# Patient Record
Sex: MALE | Race: WHITE | Employment: OTHER | URBAN - METROPOLITAN AREA
[De-identification: names, ages, dates, MRNs, and addresses within clinical notes are randomized per-mention and may not be internally consistent; named-entity substitution may affect disease eponyms.]

---

## 2022-05-04 ENCOUNTER — TRANSCRIBE ORDER (OUTPATIENT)
Dept: SCHEDULING | Age: 67
End: 2022-05-04

## 2022-05-04 DIAGNOSIS — M25.512 LEFT SHOULDER PAIN, UNSPECIFIED CHRONICITY: Primary | ICD-10-CM

## 2022-05-10 ENCOUNTER — HOSPITAL ENCOUNTER (OUTPATIENT)
Dept: MRI IMAGING | Age: 67
End: 2022-05-10
Attending: ORTHOPAEDIC SURGERY

## 2022-05-12 ENCOUNTER — HOSPITAL ENCOUNTER (OUTPATIENT)
Dept: MRI IMAGING | Age: 67
Discharge: HOME | End: 2022-05-12
Attending: ORTHOPAEDIC SURGERY
Payer: COMMERCIAL

## 2022-05-12 DIAGNOSIS — M25.512 LEFT SHOULDER PAIN, UNSPECIFIED CHRONICITY: ICD-10-CM

## 2022-05-12 PROCEDURE — 73221 MRI JOINT UPR EXTREM W/O DYE: CPT

## 2022-12-20 ENCOUNTER — OFFICE VISIT (OUTPATIENT)
Dept: ORTHOPEDIC SURGERY | Age: 67
End: 2022-12-20
Payer: COMMERCIAL

## 2022-12-20 DIAGNOSIS — M25.531 RIGHT WRIST PAIN: ICD-10-CM

## 2022-12-20 DIAGNOSIS — M25.521 RIGHT ELBOW PAIN: Primary | ICD-10-CM

## 2022-12-20 DIAGNOSIS — S46.112A RUPTURE LONG HEAD BICEPS TENDON, LEFT, INITIAL ENCOUNTER: ICD-10-CM

## 2022-12-20 DIAGNOSIS — S50.11XA CONTUSION OF RIGHT FOREARM, INITIAL ENCOUNTER: ICD-10-CM

## 2022-12-20 DIAGNOSIS — M25.512 ACUTE PAIN OF LEFT SHOULDER: ICD-10-CM

## 2022-12-20 NOTE — LETTER
12/20/2022    Patient: Rosibel Flanagan   YOB: 1955   Date of Visit: 01/70/1954     Tyrone Carlson MD  [facility] [address] [address]  Via Fax: [phone]    Dear Tyrone Carlson MD,      Thank you for referring Mr. Payal Mejia to [facility] for evaluation. My notes for this consultation are attached. If you have questions, please do not hesitate to call me. I look forward to following your patient along with you.       Sincerely,    Quintin Becker MD

## 2022-12-20 NOTE — PROGRESS NOTES
Ange Officer (: 1955) is a 78 y.o. male, patient, here for evaluation of the following chief complaint(s):  Shoulder Pain (Left shoulder and right forearm)       HPI:    Patient presents to the office today with a chief complaint of right elbow wrist pain and chronic pain of the left shoulder. He originally made the appointment for his left shoulder seeking a second opinion in regards to chronic shoulder pain. Since making the appointment he slipped and fell on his back deck and landed on his forearm. He is here today with the above mentioned right elbow and wrist pain. Specifically he is noting some discomfort that is mostly located in the ulnar aspect of the wrist.  In regards to the left shoulder, he has had a surgical procedure performed. I do not have a copy of the operative report. There is an MRI within the system that was done before his surgery. Patient describes an acromioplasty and possibly distal clavicle. It also sounds as if a tenotomy of his bicep was performed. He did not know this and subsequently in his recovery developed a deformity of his bicep. He was surprised to hear that his bicep was cut during surgery as he had no prior knowledge of this. Nevertheless, he is describing some discomfort that is mostly located along the high lateral aspect of the deltoid region. He denies numbness or tingling    Allergies   Allergen Reactions    Bee Sting [Sting, Bee] Unknown (comments)    Cat/Feline Products Runny Nose     Itchy eyes, sneezing    Suphedrine [Pseudoephedrine Hcl] Other (comments)     Hyper /anxious       Current Outpatient Medications   Medication Sig    oxyCODONE IR (ROXICODONE) 10 mg Tab immediate release tablet Take 1 Tab by mouth every three (3) hours as needed. diazepam (VALIUM) 10 mg tablet Take 1 Tab by mouth every six (6) hours as needed (Muscle Spasms). ibuprofen 200 mg Cap Take 800 mg by mouth daily.       methadone (DOLOPHINE) 5 mg tablet Take 10 mg by mouth three (3) times daily. aspirin 81 mg tablet Take 81 mg by mouth daily. omeprazole (PRILOSEC) 20 mg capsule      No current facility-administered medications for this visit. Past Medical History:   Diagnosis Date    Arthritis     GERD (gastroesophageal reflux disease)     HTN (hypertension) 2010    no longer treated    Inguinal hernia 2010        Past Surgical History:   Procedure Laterality Date    HX HERNIA REPAIR  2-3-2011    [facility] REPAIR    HX TONSILLECTOMY  1961    [facility] ABDOMEN SURGERY PROC UNLISTED  2008    RT INGUNAL HERNIA    [facility] APPENDECTOMY  1964       Family History   Problem Relation Age of Onset    Cancer Father         THROAT/STOMACH    Other Mother         neuropathy/ LOW BLOOD PRESSURE    Heart Disease Mother         CHF    Diabetes Maternal Aunt     Cancer Maternal Aunt         BONE    Cancer Paternal Grandmother         BREAST        Social History     Socioeconomic History    Marital status:      Spouse name: Not on file    Number of children: Not on file    Years of education: Not on file    Highest education level: Not on file   Occupational History    Not on file   Tobacco Use    Smoking status: Former     Packs/day: 1.00     Years: 20.00     Pack years: 20.00     Types: Cigarettes     Quit date: 2009     Years since quittin.8    Smokeless tobacco: Not on file   Substance and Sexual Activity    Alcohol use: No    Drug use: No    Sexual activity: Not on file   Other Topics Concern    Not on file   Social History Narrative    Not on file     Social Determinants of Health     Financial Resource Strain: Not on file   Food Insecurity: Not on file   Transportation Needs: Not on file   Physical Activity: Not on file   Stress: Not on file   Social Connections: Not on file   Intimate Partner Violence: Not on file   Housing Stability: Not on file       Review of Systems   Musculoskeletal:         Shoulder and right elbow      Vitals:   There were no vitals taken for this visit. There is no height or weight on file to calculate BMI. Ortho Exam     Patient is alert and oriented x3. Patient is in no acute distress. Patient ambulates with a nonantalgic gait. Right elbow: No effusion of the elbow. He has full range of motion of the elbow joint. He does have a contusion and abrasion noted along the dorsal aspect of the forearm with a small amount of soft tissue and ecchymosis along the site. He has full range of motion of the elbow joint. He has no instability or crepitation. He does have tenderness noted to palpation along the ulnar aspect of his DRUJ. Negative compression test.  He has no effusion of the wrist joint. He has full range of motion. Neurovascular examination is intact. Left shoulder: He has limitation of active range of motion secondary to pain his pain is located along the anterolateral aspect of the deltoid region. He has a Yuan deformity that is noted. He has no atrophy of his deltoid. His strength is hard to ascertain because of pain but he has what appears to be 4/5 strength throughout. There is no crepitation. He has full passive range of motion. Positive impingement sign. Pain is noted along the posterior lateral aspect of the deltoid region. There is no swelling noted distally. Neurovascular examination is intact. XR Results (most recent):  Results from Appointment encounter on 12/20/22    XR WRIST RT AP/LAT    Narrative  Three-view x-ray of the right wrist reveals no acute processes noted. Cystic changes noted within the lunate which are nonpathologic and not acute. XR Results (most recent):  Results from Appointment encounter on 12/20/22    XR WRIST RT AP/LAT    Narrative  Three-view x-ray of the right wrist reveals no acute processes noted. Cystic changes noted within the lunate which are nonpathologic and not acute.       XR ELBOW RT AP/LAT    Narrative  2 view x-ray of the right elbow reveals no evidence of fracture. No evidence of osteoarthritis noted. ASSESSMENT/PLAN:    In regards to the right upper extremity, it does not appear an acute process has occurred. He does have a little bit of pain in his DRUJ. This is a fresh injury with a contusion I suspect it will resolve so giving this a little bit more time would be appropriate. He is agreeable to this. In regards to the left shoulder, he has developed left shoulder chronic pain and has had surgery. At least, the water is muddy and I would suggest proceeding with an MRI. He does have some bicep complaint but his current level of pain is little disconnected from his bicep finding. Therefore, an MRI would be very helpful at this stage.   Patient agrees and is to return to the office after the Vidal Weston MD

## 2023-01-04 ENCOUNTER — OFFICE VISIT (OUTPATIENT)
Dept: ORTHOPEDIC SURGERY | Age: 68
End: 2023-01-04
Payer: COMMERCIAL

## 2023-01-04 DIAGNOSIS — M25.512 ACUTE PAIN OF LEFT SHOULDER: Primary | ICD-10-CM

## 2023-01-04 DIAGNOSIS — S46.112A STRAIN OF LONG HEAD OF BICEPS, LEFT, INITIAL ENCOUNTER: ICD-10-CM

## 2023-01-04 DIAGNOSIS — M75.112 INCOMPLETE ROTATOR CUFF TEAR OR RUPTURE OF LEFT SHOULDER, NOT SPECIFIED AS TRAUMATIC: Primary | ICD-10-CM

## 2023-01-04 RX ORDER — DICLOFENAC SODIUM 75 MG/1
75 TABLET, DELAYED RELEASE ORAL 2 TIMES DAILY
Qty: 60 TABLET | Refills: 0 | Status: SHIPPED | OUTPATIENT
Start: 2023-01-04

## 2023-01-04 NOTE — PROGRESS NOTES
Alexa Martins (: 1955) is a 78 y.o. male, patient, here for evaluation of the following chief complaint(s):  Shoulder Pain (Let shoulder MRI resuts)       HPI:    Patient returns to the office now status post MRI evaluation. He continues to have pain. He describes his pain 7 out of 10. He states it is debilitating. Not able to do simple daily activities let alone recreational activities such as playing his guitar. Allergies   Allergen Reactions    Bee Sting [Sting, Bee] Unknown (comments)    Cat/Feline Products Runny Nose     Itchy eyes, sneezing    Suphedrine [Pseudoephedrine Hcl] Other (comments)     Hyper /anxious       Current Outpatient Medications   Medication Sig    oxyCODONE IR (ROXICODONE) 10 mg Tab immediate release tablet Take 1 Tab by mouth every three (3) hours as needed. diazepam (VALIUM) 10 mg tablet Take 1 Tab by mouth every six (6) hours as needed (Muscle Spasms). ibuprofen 200 mg Cap Take 800 mg by mouth daily. methadone (DOLOPHINE) 5 mg tablet Take 10 mg by mouth three (3) times daily. aspirin 81 mg tablet Take 81 mg by mouth daily. omeprazole (PRILOSEC) 20 mg capsule      No current facility-administered medications for this visit.        Past Medical History:   Diagnosis Date    Arthritis     GERD (gastroesophageal reflux disease)     HTN (hypertension) 2010    no longer treated    Inguinal hernia 2010        Past Surgical History:   Procedure Laterality Date    HX HERNIA REPAIR  2-3-2011    [facility] REPAIR    HX TONSILLECTOMY      [place] ABDOMEN SURGERY PROC UNLISTED  2008    RT INGUNAL HERNIA    [place] APPENDECTOMY  1964       Family History   Problem Relation Age of Onset    Cancer Father         THROAT/STOMACH    Other Mother         neuropathy/ LOW BLOOD PRESSURE    Heart Disease Mother         CHF    Diabetes Maternal Aunt     Cancer Maternal Aunt         BONE    Cancer Paternal Grandmother         BREAST        Social History     Socioeconomic History    Marital status:      Spouse name: Not on file    Number of children: Not on file    Years of education: Not on file    Highest education level: Not on file   Occupational History    Not on file   Tobacco Use    Smoking status: Former     Packs/day: 1.00     Years: 20.00     Pack years: 20.00     Types: Cigarettes     Quit date: 2009     Years since quittin.9    Smokeless tobacco: Not on file   Substance and Sexual Activity    Alcohol use: No    Drug use: No    Sexual activity: Not on file   Other Topics Concern    Not on file   Social History Narrative    Not on file     Social Determinants of Health     Financial Resource Strain: Not on file   Food Insecurity: Not on file   Transportation Needs: Not on file   Physical Activity: Not on file   Stress: Not on file   Social Connections: Not on file   Intimate Partner Violence: Not on file   Housing Stability: Not on file       Review of Systems   Musculoskeletal:         Left shoulder      Vitals: There were no vitals taken for this visit. There is no height or weight on file to calculate BMI. Ortho Exam     Patient is alert and oriented x3. Patient is in no acute distress. Patient ambulates with a nonantalgic gait. Right elbow: No effusion of the elbow. He has full range of motion of the elbow joint. He does have a contusion and abrasion noted along the dorsal aspect of the forearm with a small amount of soft tissue and ecchymosis along the site. He has full range of motion of the elbow joint. He has no instability or crepitation. He does have tenderness noted to palpation along the ulnar aspect of his DRUJ. Negative compression test.  He has no effusion of the wrist joint. He has full range of motion. Neurovascular examination is intact. Left shoulder: He has limitation of active range of motion secondary to pain his pain is located along the anterolateral aspect of the deltoid region.   He has a Yuan deformity that is noted. He has no atrophy of his deltoid. His strength is hard to ascertain because of pain but he has what appears to be 4/5 strength throughout. There is no crepitation. He has full passive range of motion. Positive impingement sign. Pain is noted along the posterior lateral aspect of the deltoid region. There is no swelling noted distally. Neurovascular examination is intact. XR Results (most recent):  Results from Appointment encounter on 12/20/22     XR WRIST RT AP/LAT     Narrative  Three-view x-ray of the right wrist reveals no acute processes noted. Cystic changes noted within the lunate which are nonpathologic and not acute. XR Results (most recent):  Results from Appointment encounter on 12/20/22     XR WRIST RT AP/LAT     Narrative  Three-view x-ray of the right wrist reveals no acute processes noted. Cystic changes noted within the lunate which are nonpathologic and not acute. MRI shows evidence of incomplete rotator cuff tendon tear. He also has edema that is noted in the infraspinatus muscle belly consistent with muscular injury. Patient also has long head biceps strain consistent with prior bicep tenotomy. ASSESSMENT/PLAN:    Gone over the findings with the patient. He said a 1 major thing he has several smaller things. Have gone through operative versus nonoperative management. Patient states given his level of pain he would prefer to proceed with surgical invention. This most likely would require rotator cuff repair. We did talk about potential for bicep tenodesis. Based on his exam and his prior history it sounds as if the bicep tenotomy was performed. We can certainly explore but I cannot guarantee will be able to perform a bicep tenodesis in this situation. He understands. Have gone through the surgery in great detail. We discussed the risks and benefits. The risks and benefits were described to the patient.   Patient understands there is a risk of infection, postoperative pain, numbness, tingling, stiffness MI, DVT, PE, and other unforeseen events. The patient also understands there is a long rehabilitative process that typically follows the surgical procedure. We talked about the possibility of not being able to alleviate all of the discomfort. Also, I explained  there is no guarantee all the function and strength will return. The patient also understands the risks of re-tear or failure to heal.  The patient understands implants may be utilized during this surgery. The patient also understands the generalized, associated risk of anesthetic and wishes to proceed in an elective fashion.         Chiki Antoine MD

## 2023-01-04 NOTE — LETTER
1/4/2023    Patient: Sarah Mcdermott   YOB: 1955   Date of Visit: 7/4/4802     Nelia King MD  [address] [place] [address] [address] [id number]  Via Fax: [phone]    Dear Nelia King MD,      Thank you for referring Mr. Tere Stapleton to [facility] for evaluation. My notes for this consultation are attached. If you have questions, please do not hesitate to call me. I look forward to following your patient along with you.       Sincerely,    Tonja Quintanilla MD

## 2023-01-05 DIAGNOSIS — M75.112 INCOMPLETE ROTATOR CUFF TEAR OR RUPTURE OF LEFT SHOULDER, NOT SPECIFIED AS TRAUMATIC: Primary | ICD-10-CM

## 2023-02-06 DIAGNOSIS — Z98.890 HISTORY OF REPAIR OF LEFT ROTATOR CUFF: Primary | ICD-10-CM

## 2023-02-06 RX ORDER — HYDROMORPHONE HYDROCHLORIDE 2 MG/1
2 TABLET ORAL
Qty: 40 TABLET | Refills: 0 | Status: SHIPPED | OUTPATIENT
Start: 2023-02-06 | End: 2023-02-13

## 2023-02-16 ENCOUNTER — OFFICE VISIT (OUTPATIENT)
Dept: ORTHOPEDIC SURGERY | Age: 68
End: 2023-02-16
Payer: COMMERCIAL

## 2023-02-16 DIAGNOSIS — Z98.890 STATUS POST ROTATOR CUFF REPAIR: Primary | ICD-10-CM

## 2023-02-16 PROCEDURE — 99024 POSTOP FOLLOW-UP VISIT: CPT | Performed by: ORTHOPAEDIC SURGERY

## 2023-02-16 NOTE — PROGRESS NOTES
Mainor Brown (: 1955) is a 78 y.o. male, patient, here for evaluation of the following chief complaint(s):  Shoulder Pain (Left shoulder )       HPI:    Patient presents today for follow-up evaluation of his left shoulder. He is 1 week status post left biceps tenodesis and arthroscopic rotator cuff repair with Regeneten patch. He has been doing well. He has no acute complaints. He denies any chest pain or shortness of breath. Allergies   Allergen Reactions    Bee Sting [Sting, Bee] Unknown (comments)    Cat/Feline Products Runny Nose     Itchy eyes, sneezing    Suphedrine [Pseudoephedrine Hcl] Other (comments)     Hyper /anxious       Current Outpatient Medications   Medication Sig    diclofenac EC (VOLTAREN) 75 mg EC tablet Take 1 Tablet by mouth two (2) times a day. oxyCODONE IR (ROXICODONE) 10 mg Tab immediate release tablet Take 1 Tab by mouth every three (3) hours as needed. diazepam (VALIUM) 10 mg tablet Take 1 Tab by mouth every six (6) hours as needed (Muscle Spasms). ibuprofen 200 mg Cap Take 800 mg by mouth daily. methadone (DOLOPHINE) 5 mg tablet Take 10 mg by mouth three (3) times daily. aspirin 81 mg tablet Take 81 mg by mouth daily. omeprazole (PRILOSEC) 20 mg capsule      No current facility-administered medications for this visit.        Past Medical History:   Diagnosis Date    Arthritis     GERD (gastroesophageal reflux disease)     HTN (hypertension) 2010    no longer treated    Inguinal hernia 2010        Past Surgical History:   Procedure Laterality Date    HX HERNIA REPAIR  2-3-2011    [facility] REPAIR    HX TONSILLECTOMY      IN APPENDECTOMY      IN UNLISTED PROCEDURE ABDOMEN PERITONEUM & OMENTUM  2008    RT INGUNAL HERNIA       Family History   Problem Relation Age of Onset    Cancer Father         [facility]    Other Mother         neuropathy/ LOW BLOOD PRESSURE    Heart Disease Mother         CHF    Diabetes Maternal Aunt     Cancer Maternal Aunt         BONE    Cancer Paternal Grandmother         BREAST        Social History     Socioeconomic History    Marital status:      Spouse name: Not on file    Number of children: Not on file    Years of education: Not on file    Highest education level: Not on file   Occupational History    Not on file   Tobacco Use    Smoking status: Former     Packs/day: 1.00     Years: 20.00     Pack years: 20.00     Types: Cigarettes     Quit date: 2009     Years since quittin.0    Smokeless tobacco: Not on file   Substance and Sexual Activity    Alcohol use: No    Drug use: No    Sexual activity: Not on file   Other Topics Concern    Not on file   Social History Narrative    Not on file     Social Determinants of Health     Financial Resource Strain: Not on file   Food Insecurity: Not on file   Transportation Needs: Not on file   Physical Activity: Not on file   Stress: Not on file   Social Connections: Not on file   Intimate Partner Violence: Not on file   Housing Stability: Not on file       Review of Systems   Musculoskeletal:         Left shoulder pain       Vitals: There were no vitals taken for this visit. There is no height or weight on file to calculate BMI. Ortho Exam     Patient is alert and oriented x3. He is in no acute distress. Left upper extremity: Sutures were removed today. All portals are well approximated and appropriately healing. His tenodesis incision has Steri-Strips to intact. There is expected postoperative swelling and ecchymosis down into the brachium. He has full range of motion to the elbow and wrist .  He is neurovascular intact down into the wrist forearm and hand. ASSESSMENT/PLAN:    Patient was provided with the [facility] physical therapy protocol. He will be starting his formal physical therapy regimen soon. He was reminded of his postoperative restrictions and to avoid any active motion about the shoulder.   Waist level activity for the elbow and wrist is okay. He is to continue using his sling when he is up and out of the house. If he is at rest he may come out of the sling and work on elbow range of motion. Would like patient to return to clinic in 4 weeks time.         Janeen Do MD

## 2023-02-22 ENCOUNTER — OFFICE VISIT (OUTPATIENT)
Dept: ORTHOPEDIC SURGERY | Age: 68
End: 2023-02-22
Payer: COMMERCIAL

## 2023-02-22 DIAGNOSIS — M25.512 ACUTE PAIN OF LEFT SHOULDER: Primary | ICD-10-CM

## 2023-02-22 DIAGNOSIS — M75.112 INCOMPLETE ROTATOR CUFF TEAR OR RUPTURE OF LEFT SHOULDER, NOT SPECIFIED AS TRAUMATIC: ICD-10-CM

## 2023-02-22 DIAGNOSIS — M25.612 STIFFNESS OF LEFT SHOULDER JOINT: ICD-10-CM

## 2023-02-22 PROCEDURE — 97530 THERAPEUTIC ACTIVITIES: CPT | Performed by: PHYSICAL THERAPIST

## 2023-02-22 PROCEDURE — 97110 THERAPEUTIC EXERCISES: CPT | Performed by: PHYSICAL THERAPIST

## 2023-02-22 PROCEDURE — 97161 PT EVAL LOW COMPLEX 20 MIN: CPT | Performed by: PHYSICAL THERAPIST

## 2023-02-22 NOTE — PROGRESS NOTES
Patient Name: Morteza Nava  CEFY:  : 1955  [x]  Patient  Verified  Payor: Haleigh Money / Plan: relocality / Product Type: HMO /      Total Treatment Time (min): 50  Total Timed Codes (min): 50    Visit #: 1 of 20    Shoulder evaluation   Referring Provider: Ilana Floyd MD  Treatment Area: Left shoulder    Subjective: The patient is a 80 y.o. male referred to physical therapy by  Ilana Floyd MD with a diagnosis of left rotator cuff tear, left shoulder pain left shoulder stiffness and status post rotator cuff repair of the left. Patient reports progressive history of left shoulder pain with failed conservative treatment. Patient reports he underwent arthroscopic rotator cuff repair in conjunction with biceps tenodesis on 2023. The patient reports he has been recovering well at home he reports stiffness and functional loss of the upper extremity with ADLs. He reports pain is controlled but remains with functional activities including difficulty with sleeping. Objective:    General: Well-nourished well-developed. Alert and oriented. No acute distress. Normal affect and mood. Pleasant on exam.     Inspection:  Skin intact left shoulder. No erythema, masses, lesions, or signs of infection. No ecchymosis. Incisional sites are healing well there is no signs of infection    Active range of motion:     Was not tested secondary to the postoperative course and passive range of motion limitation only  Wrist, hand and finger mobility is normal.    Strength:    Strength testing not completed secondary to postoperative course there is no distal , wrist and hand weakness appreciated. Passive range of motion:  External rotation-15@ 30° /scapular plane-   Internal rotation -55@ 30° /scapular plane-  Forward flexion -110  Lateral abduction -100 degrees    Arthrokinematics/joint mobility  restriction in the glenohumeral joint, posterior and inferior primary.   Graded 1 - on Kaltenborn scale. Pain: VAS scale:    1-2/10 at rest.  Described as a dull ache    5-6/10 with exercise and use. Sleeping and dressing    Posture:  Patient has moderate scapular elevation, protraction of the scapulothoracic joint. Special tests:     AIN, PIN, ulnar motor grossly intact. Palpable radial pulse. Neurologic testing-he is neurovascularly intact in the upper extremity  Spurling sign is negative    Palpation: Patient has tenderness with global palpation around the glenohumeral region as opposed to specifically over the [facility] joint subacromial space. He has tightness over the upper trapezius and levator as well as periscapular musculature    Shoulder Pain and Disability Index:  92%    Pain dysfunction pain  100%    Disability dysfunction. 97%    Total Spadi score        Treatment:  Evaluation of the involved shoulder-20 minutes  Therapeutic activity instruction 15  Home program development implementation with exercises as follows  1. Pendulum  2. Passive assisted range of motion with T-bar for external rotation in supine and sitting  3. Seated table slides with use of foam roller  4. Postural retraction depression interscapular strengthening  Therapeutic exercise 15  Passive range of motion applied to the arthrokinematic and ostial kinematic regions of the left glenohumeral scapulothoracic joints for restoration of range of motion  Capsule,osteo and arthrokinematic passive range of motion techniques were applied to the involved upper extremity. Assessment:  Patient is a retired 72-year-old gentleman referred to PT by Dr. Carla Wall following a left arthroscopic rotator cuff repair in conjunction with biceps tenodesis and subacromial decompression. The patient is doing well postoperatively he did have his sling ill fitting upon evaluation this was adjusted reapplied with proper positioning fit and form.   The patient was instructed in progressive self-directed passive range of motion activities to be completed multiple times throughout the day he was also treated with progressive passive range of motion for osteo and arthrokinematic range of motion to the glenohumeral joint. We discussed thermal modalities for pain relief as well as postural activities and functional activities to return to premorbid functional level we discussed the postoperative course including goals and outcomes      ICD-10-CM ICD-9-CM    1. Acute pain of left shoulder  M25.512 719.41       2. Incomplete rotator cuff tear or rupture of left shoulder, not specified as traumatic  M75.112 726.13       3. Stiffness of left shoulder joint  M25.612 719.51               Physical therapy goals:      Long-term goals 12 weeks  1. The patient with active range of motion above shoulder height to allow for independence with all aspects of ADLs. 2.  The patient reports pain to be 0/10 with functional ADLs. 3.  The patient will demonstrate posture with good scapular retraction and depression without substitution during active range of motion. 4. 15% improvement in shoulder pain and disability index    Short-term goal 4 weeks. 1.  Improve passive range of motion by 30% from baseline values. 2.  Reduce pain by 50% from baseline reports. 3.  The patient will demonstrate independence with home exercise program to facilitate recovery. Physical therapy plan of care:      Treatment frequency 1-2X weekly. Duration 20 visits. Focus of therapy will be on progressive restoration of range of motion and strength, balance, and functional mobility. Therapeutic applications will include but are not limited to:  Home exercise program development and implementation with updating as needed. Intramuscular dry needling to the involved region. Manual therapy, joint mobilization, myofascial release, therapeutic exercises. Modalities including ultrasound and electric stimulation heat and ice.   Kinesiotape and Farmer taping for joint reeducation and approximation of tissue for neuromuscular reeducation. Jemal Ko, PT    2/24/2023      The referring physician has reviewed and approved this evaluation and plan of care as noted by the electronic signature attached to note.

## 2023-03-02 ENCOUNTER — OFFICE VISIT (OUTPATIENT)
Dept: ORTHOPEDIC SURGERY | Age: 68
End: 2023-03-02

## 2023-03-02 DIAGNOSIS — M25.612 STIFFNESS OF LEFT SHOULDER JOINT: ICD-10-CM

## 2023-03-02 DIAGNOSIS — M75.112 INCOMPLETE ROTATOR CUFF TEAR OR RUPTURE OF LEFT SHOULDER, NOT SPECIFIED AS TRAUMATIC: ICD-10-CM

## 2023-03-02 DIAGNOSIS — M25.512 ACUTE PAIN OF LEFT SHOULDER: Primary | ICD-10-CM

## 2023-03-02 DIAGNOSIS — Z98.890 STATUS POST ROTATOR CUFF REPAIR: ICD-10-CM

## 2023-03-02 NOTE — PROGRESS NOTES
PT DAILY TREATMENT NOTE    Patient Name: Mary Black  RPZE:[id number]  : 1955  [x]  Patient  Verified  Payor: Declan Connell / Plan: Demetrius Hurd / Product Type: HMO /      Total Treatment Time (min): 40  Total Timed Codes (min): 40    Visit #: 2      Referring Melquiades Murry MD      ASSESSMENT/PLAN:  Patient appears to be less than fully compliant with the use of a sling at home. We had a long discussion in regard to its use fit and application. He tolerated passive range of motion well continues to do well with posterior mobilization and scapular retraction depression verbal and tactile cueing for improved scapulothoracic position. His passive range of motion is improving nicely. He had no questions with his home exercise program this was reviewed in detail.  - We will continue with physical therapy per current plan of care with progression towards functional goals. 1. Acute pain of left shoulder  2. Incomplete rotator cuff tear or rupture of left shoulder, not specified as traumatic  3. Stiffness of left shoulder joint  4. Status post rotator cuff repair        No follow-ups on file. SUBJECTIVE/OBJECTIVE:  HPI  Patient reports he was sore after therapy admits to not utilizing his sling 100% of the time    Physical Exam    Manual Therapy: (15minutes)  Grade II & III  arthrokinematic, and capsule mobilization with concurrent passive osteokinematic range of motion techniques were applied to the involved upper extremity. Medial stabilization of the scapular was employed as well as concomitant grade 2 and 3 glenohumeral distractions. Joint mobility techniques employed to restore correct mechanical mobility at the involved joints to allow restoration of range of motion and strength. Joints included the glenohumeral scapulothoracic and sternoclavicular regions.     Neuromuscular reeducation: (15 Minutes)  Balance posture and proprioceptive activities completed as below  neuromuscular down regulation to the myofascial soft tissue structures of the Glenohumeral and Scapulothoracic region applied with manual as well as instrument assisted techniques    Hold relax techniques were employed to assist with change in the neuromuscular firing pattern of the involved upperextremity for restoration of glenohumeral range of motion dissociated from the scapulothoracic joint. Therapeutic Exercise: (10 minutes)  Strength/Endurance/Exercises supervised and completed seS below          PT Exercise Log         Activity/Exercise   Date  3/2/2023   Table slide   x      T-bar external rotation x     Scapular retraction   x                                                     Modalities Applied: (minutes)      Range of Motion:  Passive forward elevation 115 degrees    An electronic signature was used to authenticate this note.     -- AMARIS LewisT

## 2023-03-09 ENCOUNTER — OFFICE VISIT (OUTPATIENT)
Dept: ORTHOPEDIC SURGERY | Age: 68
End: 2023-03-09

## 2023-03-09 DIAGNOSIS — M25.612 STIFFNESS OF LEFT SHOULDER JOINT: ICD-10-CM

## 2023-03-09 DIAGNOSIS — M75.112 INCOMPLETE ROTATOR CUFF TEAR OR RUPTURE OF LEFT SHOULDER, NOT SPECIFIED AS TRAUMATIC: ICD-10-CM

## 2023-03-09 DIAGNOSIS — M25.512 ACUTE PAIN OF LEFT SHOULDER: Primary | ICD-10-CM

## 2023-03-09 DIAGNOSIS — Z98.890 STATUS POST ROTATOR CUFF REPAIR: ICD-10-CM

## 2023-03-09 NOTE — PROGRESS NOTES
PT DAILY TREATMENT NOTE    Patient Name: Андрей Paula  Date:3/9/2023  : 1955  [x]  Patient  Verified  Payor: Aster Stevens / Plan: Sony Gannon / Product Type: HMO /      Total Treatment Time (min): 45  Total Timed Codes (min): 45    Visit #: 3      Referring Nicole Hernandez MD      ASSESSMENT/PLAN:  Verbalizes better compliance with sling. He reports he has soreness and pain more over the bony aspect of the surgical procedure. His passive range of motion is coming along nicely. Continues to require sling secondary to his postoperative course with functional deficits noted with use of the left upper extremity. - We will continue with physical therapy per current plan of care with progression towards functional goals. 1. Acute pain of left shoulder  2. Incomplete rotator cuff tear or rupture of left shoulder, not specified as traumatic  3. Stiffness of left shoulder joint  4. Status post rotator cuff repair        Return in about 5 days (around 3/14/2023) for CONTIUED SKILLED physical therapy. SUBJECTIVE/OBJECTIVE:  HPI  Patient reports she was sore after last visit. He reports his tenderness is over the [facility] joint    Physical Exam    Manual Therapy: (15minutes)  Grade II & III  arthrokinematic, and capsule mobilization with concurrent passive osteokinematic range of motion techniques were applied to the involved upper extremity. Medial stabilization of the scapular was employed as well as concomitant grade 2 and 3 glenohumeral distractions. Joint mobility techniques employed to restore correct mechanical mobility at the involved joints to allow restoration of range of motion and strength. Joints included the glenohumeral scapulothoracic and sternoclavicular regions.     Neuromuscular reeducation: (15 Minutes)  Balance posture and proprioceptive activities completed as below  neuromuscular down regulation to the myofascial soft tissue structures of the Glenohumeral and Scapulothoracic region applied with manual as well as instrument assisted techniques    Hold relax techniques were employed to assist with change in the neuromuscular firing pattern of the involved upperextremity for restoration of glenohumeral range of motion dissociated from the scapulothoracic joint. Therapeutic Exercise: (15 minutes)  Strength/Endurance/Exercises supervised and completed seS below          PT Exercise Log         Activity/Exercise   Date  3/9/2023   Table slide   x      T-bar external rotation x     Scapular retraction   x     Over-the-door pulley   x                                             Modalities Applied: (minutes)      Range of Motion:  Passive forward elevation 120 degrees    An electronic signature was used to authenticate this note.     -- Verline Age, MSPT

## 2023-03-16 ENCOUNTER — OFFICE VISIT (OUTPATIENT)
Dept: ORTHOPEDIC SURGERY | Age: 68
End: 2023-03-16

## 2023-03-16 DIAGNOSIS — Z98.890 HISTORY OF REPAIR OF LEFT ROTATOR CUFF: ICD-10-CM

## 2023-03-16 DIAGNOSIS — Z98.890 STATUS POST ROTATOR CUFF REPAIR: ICD-10-CM

## 2023-03-16 DIAGNOSIS — M25.612 STIFFNESS OF LEFT SHOULDER JOINT: ICD-10-CM

## 2023-03-16 DIAGNOSIS — M75.112 INCOMPLETE ROTATOR CUFF TEAR OR RUPTURE OF LEFT SHOULDER, NOT SPECIFIED AS TRAUMATIC: Primary | ICD-10-CM

## 2023-03-16 DIAGNOSIS — M25.512 ACUTE PAIN OF LEFT SHOULDER: ICD-10-CM

## 2023-03-16 PROCEDURE — 99024 POSTOP FOLLOW-UP VISIT: CPT | Performed by: ORTHOPAEDIC SURGERY

## 2023-03-16 NOTE — PROGRESS NOTES
Walk  PT DAILY TREATMENT NOTE    Patient Name: Fracisco Oh  Date:3/16/2023  : 1955  [x]  Patient  Verified  Payor: BLUE CROSS / Plan: Tonya Davalos / Product Type: HMO /      Total Treatment Time (min): 50  Total Timed Codes (min): 50    Visit #: 4      Referring Yang Hopkins MD      ASSESSMENT/PLAN:  Dr. Lexi Washington has discontinued the patient's sling and abduction pillow. We have advance the patient's activity level to phase 2 working with more active assisted exercises including standing, T-bar with stick, external rotation doorway seated pulleys. We will increase his frequency to biweekly and continue to progress functional mobility with more active exercise component  - We will continue with physical therapy per current plan of care with progression towards functional goals. 1. Incomplete rotator cuff tear or rupture of left shoulder, not specified as traumatic  2. Acute pain of left shoulder  3. Stiffness of left shoulder joint  4. Status post rotator cuff repair        No follow-ups on file. SUBJECTIVE/OBJECTIVE:  HPI  Saw the doctor today. Discontinue sling    Physical Exam    Manual Therapy: (15minutes)  Grade II & III  arthrokinematic, and capsule mobilization with concurrent passive osteokinematic range of motion techniques were applied to the involved upper extremity. Medial stabilization of the scapular was employed as well as concomitant grade 2 and 3 glenohumeral distractions. Joint mobility techniques employed to restore correct mechanical mobility at the involved joints to allow restoration of range of motion and strength. Joints included the glenohumeral scapulothoracic and sternoclavicular regions.     Neuromuscular reeducation: (15 Minutes)  Balance posture and proprioceptive activities completed as below  neuromuscular down regulation to the myofascial soft tissue structures of the Glenohumeral and Scapulothoracic region applied with manual as well as instrument assisted techniques    Hold relax techniques were employed to assist with change in the neuromuscular firing pattern of the involved upperextremity for restoration of glenohumeral range of motion dissociated from the scapulothoracic joint. Therapeutic Exercise: (20 minutes)  Strength/Endurance/Exercises supervised and completed seS below          PT Exercise Log         Activity/Exercise   Date  3/16/2023   Table slide   x      T-bar external rotation x     Scapular retraction   x     Over-the-door pulley   x     T-bar flexion supine standing x     Over-the-door pulley x     Doorway external rotation x     Wall walk active assisted range of motion x                     Modalities Applied: (minutes)      Range of Motion:  Passive forward elevation 125 degrees moderate scapular substitution  Passive lateral abduction 100  Passive external rotation 38 passive internal rotation 40    An electronic signature was used to authenticate this note.     -- Mackenzie Sosa, MSPT

## 2023-03-16 NOTE — PROGRESS NOTES
Librado Koch (: 1955) is a 80 y.o. male, patient, here for evaluation of the following chief complaint(s):  Shoulder Pain (Left shoulder )       HPI:    Patient returns to the office now status post rotator cuff repair of the left. Patient states he is doing well his pain is controlled. Is been attending physical therapy and a regular basis. Allergies   Allergen Reactions    Bee Sting [Sting, Bee] Unknown (comments)    Cat/Feline Products Runny Nose     Itchy eyes, sneezing    Suphedrine [Pseudoephedrine Hcl] Other (comments)     Hyper /anxious       Current Outpatient Medications   Medication Sig    diclofenac EC (VOLTAREN) 75 mg EC tablet Take 1 Tablet by mouth two (2) times a day. oxyCODONE IR (ROXICODONE) 10 mg Tab immediate release tablet Take 1 Tab by mouth every three (3) hours as needed. diazepam (VALIUM) 10 mg tablet Take 1 Tab by mouth every six (6) hours as needed (Muscle Spasms). ibuprofen 200 mg Cap Take 800 mg by mouth daily. methadone (DOLOPHINE) 5 mg tablet Take 10 mg by mouth three (3) times daily. aspirin 81 mg tablet Take 81 mg by mouth daily. omeprazole (PRILOSEC) 20 mg capsule      No current facility-administered medications for this visit.        Past Medical History:   Diagnosis Date    Arthritis     GERD (gastroesophageal reflux disease)     HTN (hypertension) 2010    no longer treated    Inguinal hernia 2010        Past Surgical History:   Procedure Laterality Date    HX HERNIA REPAIR  2-3-2011    RIH REPAIR    HX TONSILLECTOMY      VT APPENDECTOMY  1964    VT UNLISTED PROCEDURE ABDOMEN PERITONEUM & OMENTUM  2008    RT INGUNAL HERNIA       Family History   Problem Relation Age of Onset    Cancer Father         [facility]    Other Mother         neuropathy/ LOW BLOOD PRESSURE    Heart Disease Mother         CHF    Diabetes Maternal Aunt     Cancer Maternal Aunt         BONE    Cancer Paternal Grandmother         BREAST        Social History     Socioeconomic History    Marital status:      Spouse name: Not on file    Number of children: Not on file    Years of education: Not on file    Highest education level: Not on file   Occupational History    Not on file   Tobacco Use    Smoking status: Former     Packs/day: 1.00     Years: 20.00     Pack years: 20.00     Types: Cigarettes     Quit date: 2009     Years since quittin.1    Smokeless tobacco: Not on file   Substance and Sexual Activity    Alcohol use: No    Drug use: No    Sexual activity: Not on file   Other Topics Concern    Not on file   Social History Narrative    Not on file     Social Determinants of Health     Financial Resource Strain: Not on file   Food Insecurity: Not on file   Transportation Needs: Not on file   Physical Activity: Not on file   Stress: Not on file   Social Connections: Not on file   Intimate Partner Violence: Not on file   Housing Stability: Not on file       Review of Systems   Musculoskeletal:         Left shoulder      Vitals: There were no vitals taken for this visit. There is no height or weight on file to calculate BMI. Ortho Exam     Left shoulder: Portal sites of healed. Passively I can get him up to 120 degrees of forward elevation, 80 degrees lateral duction 20 degrees of external rotation. No active strength testing was performed today. There is no swelling noted distally. Neurovascular examination is intact. ASSESSMENT/PLAN:    Patient is doing very well. We will now advance out of the sling. Have gone over the restrictions moving forward. He was given a new prescription for physical therapy and is to return to the office in 4 weeks.         Amanda Connolly MD

## 2023-03-16 NOTE — LETTER
3/16/2023    Patient: Tatiana Vogt   YOB: 1955   Date of Visit: 9/58/0246     Ethel Owens MD  [address] [place] [address] [address] [id number]  Via Fax: [phone]    Dear Ethel Owens MD,      Thank you for referring Mr. Tawanna Alicea to [facility] for evaluation. My notes for this consultation are attached. If you have questions, please do not hesitate to call me. I look forward to following your patient along with you.       Sincerely,    Yue Smith MD

## 2023-03-21 ENCOUNTER — OFFICE VISIT (OUTPATIENT)
Dept: ORTHOPEDIC SURGERY | Age: 68
End: 2023-03-21
Payer: COMMERCIAL

## 2023-03-21 DIAGNOSIS — M75.112 INCOMPLETE ROTATOR CUFF TEAR OR RUPTURE OF LEFT SHOULDER, NOT SPECIFIED AS TRAUMATIC: Primary | ICD-10-CM

## 2023-03-21 DIAGNOSIS — M25.512 ACUTE PAIN OF LEFT SHOULDER: ICD-10-CM

## 2023-03-21 DIAGNOSIS — Z98.890 HISTORY OF REPAIR OF LEFT ROTATOR CUFF: ICD-10-CM

## 2023-03-21 DIAGNOSIS — Z98.890 STATUS POST ROTATOR CUFF REPAIR: ICD-10-CM

## 2023-03-21 DIAGNOSIS — M25.612 STIFFNESS OF LEFT SHOULDER JOINT: ICD-10-CM

## 2023-03-21 PROCEDURE — 97110 THERAPEUTIC EXERCISES: CPT | Performed by: PHYSICAL THERAPIST

## 2023-03-21 PROCEDURE — 97014 ELECTRIC STIMULATION THERAPY: CPT | Performed by: PHYSICAL THERAPIST

## 2023-03-21 PROCEDURE — 97112 NEUROMUSCULAR REEDUCATION: CPT | Performed by: PHYSICAL THERAPIST

## 2023-03-21 PROCEDURE — 97140 MANUAL THERAPY 1/> REGIONS: CPT | Performed by: PHYSICAL THERAPIST

## 2023-03-21 NOTE — PROGRESS NOTES
Walk  PT DAILY TREATMENT NOTE    Patient Name: Wiliam Merino  Date:3/21/2023  : 1955  [x]  Patient  Verified  Payor: Adam Lefort / Plan: Augusta Lexie / Product Type: HMO /      Total Treatment Time (min): 60  Total Timed Codes (min): 60    Visit #: 4      Referring Oni Mulligan MD      ASSESSMENT/PLAN:  Dr. Telma May has discontinued the patient's sling and abduction pillow. We have advance the patient's activity level to phase 2 working with more active assisted exercises including standing, T-bar with stick, external rotation doorway seated pulleys. We will increase his frequency to biweekly and continue to progress functional mobility with more active exercise component  - We will continue with physical therapy per current plan of care with progression towards functional goals. 1. Incomplete rotator cuff tear or rupture of left shoulder, not specified as traumatic  2. Acute pain of left shoulder  3. Stiffness of left shoulder joint  4. Status post rotator cuff repair  5. History of repair of left rotator cuff        Return in about 2 days (around 3/23/2023) for CONTIUED SKILLED physical therapy. SUBJECTIVE/OBJECTIVE:  HPI  Complains of some increased pain with new activities including  Has pain in the subdeltoid bursa with active range of motion  Physical Exam    Manual Therapy: (15minutes)  Grade II & III  arthrokinematic, and capsule mobilization with concurrent passive osteokinematic range of motion techniques were applied to the involved upper extremity. Medial stabilization of the scapular was employed as well as concomitant grade 2 and 3 glenohumeral distractions. Joint mobility techniques employed to restore correct mechanical mobility at the involved joints to allow restoration of range of motion and strength. Joints included the glenohumeral scapulothoracic and sternoclavicular regions.     Neuromuscular reeducation: (15 Minutes)  Balance posture and proprioceptive activities completed as below  neuromuscular down regulation to the myofascial soft tissue structures of the Glenohumeral and Scapulothoracic region applied with manual as well as instrument assisted techniques    Hold relax techniques were employed to assist with change in the neuromuscular firing pattern of the involved upperextremity for restoration of glenohumeral range of motion dissociated from the scapulothoracic joint. Therapeutic Exercise: (15 minutes)  Strength/Endurance/Exercises supervised and completed seS below          PT Exercise Log         Activity/Exercise   Date  3/21/2023      x      T-bar external rotation x     Scapular retraction   x        x     T-bar flexion supine standing x     Over-the-door pulley x     Doorway external rotation x     Wall walk active assisted range of motion x     Dap seated active assisted range of motion 1.75 kg x               Modalities Applied: (15 minutes)  Interferential electrical stimulation with ice posttreatment 15 minutes left shoulder    Range of Motion:  Passive forward elevation 125 degrees moderate scapular substitution  Passive lateral abduction 110  Passive external rotation 35 passive internal rotation 40    An electronic signature was used to authenticate this note.     -- AMARIS LewisT

## 2023-03-22 NOTE — PROGRESS NOTES
Walk  PT DAILY TREATMENT NOTE    Patient Name: Sally Rucker  Date:3/22/2023  : 1955  [x]  Patient  Verified  Payor: Mallory Rodriguez / Plan: Mohsen Given / Product Type: HMO /      Total Treatment Time (min): 65  Total Timed Codes (min): 65      Referring Nish Gabriel MD      ASSESSMENT/PLAN:  The patient reported a significant reduction in his pain following the last treatment. We had utilized e-stim for pain relief this was applied again today at the end of his treatment. He is showing slow steady progress with his passive range of motion and strength deficits remain. - We will continue with physical therapy per current plan of care with progression towards functional goals. 1. Incomplete rotator cuff tear or rupture of left shoulder, not specified as traumatic  2. Acute pain of left shoulder  3. Stiffness of left shoulder joint  4. Status post rotator cuff repair        No follow-ups on file. SUBJECTIVE/OBJECTIVE:  HPI  Complains of some increased pain with new activities including  Has pain in the subdeltoid bursa with active range of motion  Physical Exam    Manual Therapy: (15minutes)  Grade II & III  arthrokinematic, and capsule mobilization with concurrent passive osteokinematic range of motion techniques were applied to the involved upper extremity. Medial stabilization of the scapular was employed as well as concomitant grade 2 and 3 glenohumeral distractions. Joint mobility techniques employed to restore correct mechanical mobility at the involved joints to allow restoration of range of motion and strength. Joints included the glenohumeral scapulothoracic and sternoclavicular regions.     Neuromuscular reeducation: (15 Minutes)  Balance posture and proprioceptive activities completed as below  neuromuscular down regulation to the myofascial soft tissue structures of the Glenohumeral and Scapulothoracic region applied with manual as well as instrument assisted techniques    Hold relax techniques were employed to assist with change in the neuromuscular firing pattern of the involved upperextremity for restoration of glenohumeral range of motion dissociated from the scapulothoracic joint. Therapeutic Exercise: (25 minutes)  Strength/Endurance/Exercises supervised and completed seS below          PT Exercise Log         Activity/Exercise   Date  3/22/2023     Fall on Total Gym Scap retraction x      T-bar external rotation x     Scapular retraction   x     Total Gym ball roll   x     T-bar flexion supine standing x     Over-the-door pulley x     Doorway external rotation x     Wall walk active assisted range of motion x     Dap seated active assisted range of motion 1.75 kg x     Passive range of motion involved upper extremity          Modalities Applied: (10 minutes)  Interferential electrical stimulation with ice posttreatment 10 minutes left shoulder    Range of Motion:  Passive forward elevation 125 degrees moderate scapular substitution  Passive lateral abduction 110  Passive external rotation 35 passive internal rotation 40    An electronic signature was used to authenticate this note.     -- AMARIS LiT

## 2023-03-23 ENCOUNTER — OFFICE VISIT (OUTPATIENT)
Dept: ORTHOPEDIC SURGERY | Age: 68
End: 2023-03-23

## 2023-03-23 DIAGNOSIS — Z98.890 STATUS POST ROTATOR CUFF REPAIR: ICD-10-CM

## 2023-03-23 DIAGNOSIS — M25.612 STIFFNESS OF LEFT SHOULDER JOINT: ICD-10-CM

## 2023-03-23 DIAGNOSIS — M25.512 ACUTE PAIN OF LEFT SHOULDER: ICD-10-CM

## 2023-03-23 DIAGNOSIS — Z98.890 HISTORY OF REPAIR OF LEFT ROTATOR CUFF: ICD-10-CM

## 2023-03-23 DIAGNOSIS — M75.112 INCOMPLETE ROTATOR CUFF TEAR OR RUPTURE OF LEFT SHOULDER, NOT SPECIFIED AS TRAUMATIC: Primary | ICD-10-CM

## 2023-03-28 ENCOUNTER — OFFICE VISIT (OUTPATIENT)
Dept: ORTHOPEDIC SURGERY | Age: 68
End: 2023-03-28
Payer: COMMERCIAL

## 2023-03-28 DIAGNOSIS — M25.612 STIFFNESS OF LEFT SHOULDER JOINT: ICD-10-CM

## 2023-03-28 DIAGNOSIS — Z98.890 STATUS POST ROTATOR CUFF REPAIR: ICD-10-CM

## 2023-03-28 DIAGNOSIS — M25.512 ACUTE PAIN OF LEFT SHOULDER: ICD-10-CM

## 2023-03-28 DIAGNOSIS — M75.112 INCOMPLETE ROTATOR CUFF TEAR OR RUPTURE OF LEFT SHOULDER, NOT SPECIFIED AS TRAUMATIC: Primary | ICD-10-CM

## 2023-03-28 PROCEDURE — 97140 MANUAL THERAPY 1/> REGIONS: CPT | Performed by: PHYSICAL THERAPIST

## 2023-03-28 PROCEDURE — 97110 THERAPEUTIC EXERCISES: CPT | Performed by: PHYSICAL THERAPIST

## 2023-03-28 PROCEDURE — 97112 NEUROMUSCULAR REEDUCATION: CPT | Performed by: PHYSICAL THERAPIST

## 2023-03-28 NOTE — PROGRESS NOTES
Walk  PT DAILY TREATMENT NOTE    Patient Name: Pham Mazariegos  Date:3/28/2023  : 1955  [x]  Patient  Verified  Payor: Vikas Zamudio / Plan: Alison Kenney / Product Type: HMO /      Total Treatment Time (min): 65  Total Timed Codes (min): 65      Referring Jorge Sorenson MD      ASSESSMENT/PLAN:  Patient continues to have the majority of his pain in the posterior aspect of his shoulder. He does well with posterior capsule stretch mobility but clearly has tightness with noticeable rebound following the stretching activities were slowly progressing continued active assisted range of motion with focus on rotation overhead elevation and AB duction. Rotator cuff strengthening isometric progression also continues pain relief measures with e-stim has been helpful. He continues to require skilled care to return to premorbid functional level including mobilization  - We will continue with physical therapy per current plan of care with progression towards functional goals. 1. Incomplete rotator cuff tear or rupture of left shoulder, not specified as traumatic  2. Acute pain of left shoulder  3. Stiffness of left shoulder joint  4. Status post rotator cuff repair        No follow-ups on file. SUBJECTIVE/OBJECTIVE:  HPI  Contains of pain the posterior aspect of the shoulder  Physical Exam    Manual Therapy: (15minutes)  Grade II & III  arthrokinematic, and capsule mobilization with concurrent passive osteokinematic range of motion techniques were applied to the involved upper extremity. Medial stabilization of the scapular was employed as well as concomitant grade 2 and 3 glenohumeral distractions. Joint mobility techniques employed to restore correct mechanical mobility at the involved joints to allow restoration of range of motion and strength. Joints included the glenohumeral scapulothoracic and sternoclavicular regions.     Neuromuscular reeducation: (15 Minutes)  Balance posture and proprioceptive activities completed as below  neuromuscular down regulation to the myofascial soft tissue structures of the Glenohumeral and Scapulothoracic region applied with manual as well as instrument assisted techniques    Hold relax techniques were employed to assist with change in the neuromuscular firing pattern of the involved upperextremity for restoration of glenohumeral range of motion dissociated from the scapulothoracic joint. Therapeutic Exercise: (25 minutes)  Strength/Endurance/Exercises supervised and completed seS below          PT Exercise Log         Activity/Exercise   Date  3/28/2023     Fall on Total Gym Scap retraction x      T-bar external rotation x     Scapular retraction   x     Total Gym ball roll   x     T-bar flexion supine standing x     Over-the-door pulley x     Doorway external rotation x     Wall walk active assisted range of motion x     Dap seated active assisted range of motion 1.75 kg x     Passive range of motion involved upper extremity          Modalities Applied: (10 minutes)  Interferential electrical stimulation with ice posttreatment 10 minutes left shoulder    Range of Motion:  Passive forward elevation 135 degrees moderate scapular substitution  Passive lateral abduction 115  Passive external rotation 35 passive internal rotation 40    An electronic signature was used to authenticate this note.     -- AMARIS MooreT

## 2023-03-30 ENCOUNTER — OFFICE VISIT (OUTPATIENT)
Dept: ORTHOPEDIC SURGERY | Age: 68
End: 2023-03-30
Payer: COMMERCIAL

## 2023-03-30 DIAGNOSIS — M25.612 STIFFNESS OF LEFT SHOULDER JOINT: ICD-10-CM

## 2023-03-30 DIAGNOSIS — Z98.890 STATUS POST ROTATOR CUFF REPAIR: ICD-10-CM

## 2023-03-30 DIAGNOSIS — M25.512 ACUTE PAIN OF LEFT SHOULDER: ICD-10-CM

## 2023-03-30 DIAGNOSIS — M75.112 INCOMPLETE ROTATOR CUFF TEAR OR RUPTURE OF LEFT SHOULDER, NOT SPECIFIED AS TRAUMATIC: Primary | ICD-10-CM

## 2023-03-30 PROCEDURE — 97035 APP MDLTY 1+ULTRASOUND EA 15: CPT | Performed by: PHYSICAL THERAPIST

## 2023-03-30 PROCEDURE — 97112 NEUROMUSCULAR REEDUCATION: CPT | Performed by: PHYSICAL THERAPIST

## 2023-03-30 PROCEDURE — 97140 MANUAL THERAPY 1/> REGIONS: CPT | Performed by: PHYSICAL THERAPIST

## 2023-03-30 PROCEDURE — 97110 THERAPEUTIC EXERCISES: CPT | Performed by: PHYSICAL THERAPIST

## 2023-03-30 NOTE — PROGRESS NOTES
Walk  PT DAILY TREATMENT NOTE    Patient Name: Simeon Valladares  Date:3/30/2023  : 1955  [x]  Patient  Verified  Payor: Tomas Burrell / Plan: Leighton Mccarty / Product Type: HMO /      Total Treatment Time (min): 75  Total Timed Codes (min): 75      Referring Nicola Tinsley MD      ASSESSMENT/PLAN:  We applied some ultrasound today to the subdeltoid bursa region and have encouraged a little more icing in that area as he is progressing with his active range of motion activities. We also addressed the more posterior capsule stretching in side-lying position. Patient continues to have impaired arthrokinematics of the glenohumeral joint requires continued mobilization and progressive stretching as well as strengthening advancement. Overall he is doing well may be a little behind with his external rotation motion. - We will continue with physical therapy per current plan of care with progression towards functional goals. 1. Incomplete rotator cuff tear or rupture of left shoulder, not specified as traumatic  2. Acute pain of left shoulder  3. Stiffness of left shoulder joint  4. Status post rotator cuff repair  5. History of repair of left rotator cuff        No follow-ups on file. SUBJECTIVE/OBJECTIVE:  HPI  Contains of pain the posterior aspect of the shoulder  Physical Exam    Manual Therapy: (15minutes)  Grade II & III  arthrokinematic, and capsule mobilization with concurrent passive osteokinematic range of motion techniques were applied to the involved upper extremity. Medial stabilization of the scapular was employed as well as concomitant grade 2 and 3 glenohumeral distractions. Joint mobility techniques employed to restore correct mechanical mobility at the involved joints to allow restoration of range of motion and strength. Joints included the glenohumeral scapulothoracic and sternoclavicular regions.     Neuromuscular reeducation: (15 Minutes)  Balance posture and proprioceptive activities completed as below  neuromuscular down regulation to the myofascial soft tissue structures of the Glenohumeral and Scapulothoracic region applied with manual as well as instrument assisted techniques    Hold relax techniques were employed to assist with change in the neuromuscular firing pattern of the involved upperextremity for restoration of glenohumeral range of motion dissociated from the scapulothoracic joint. Therapeutic Exercise: (25 minutes)  Strength/Endurance/Exercises supervised and completed seS below          PT Exercise Log         Activity/Exercise   Date  3/30/2023     Sallie Party on Total Gym Scap retraction x      T-bar external rotation x     Scapular retraction   x     Total Gym ball roll   x     T-bar flexion supine standing x     Over-the-door pulley x     Doorway external rotation x     Wall walk active assisted range of motion x     Dap seated active assisted range of motion 1.75 kg x     Passive range of motion involved upper extremity x         Modalities Applied: (10 minutes total)  Continuous ultrasound applied to the subdeltoid bursa 1 MHz 10 cm head 1.5 W/cm² for 8 minutes    Range of Motion:  Passive forward elevation 135 degrees moderate scapular substitution  Passive lateral abduction 120  Passive external rotation 40 passive internal rotation 40    An electronic signature was used to authenticate this note.     -- BRAD Jackson

## 2023-04-05 ENCOUNTER — OFFICE VISIT (OUTPATIENT)
Dept: ORTHOPEDIC SURGERY | Age: 68
End: 2023-04-05

## 2023-04-05 NOTE — PROGRESS NOTES
Walk  PT DAILY TREATMENT NOTE    Patient Name: Cassidy Hansen  [id number]  : 1955  [x]  Patient  Verified  Payor: Darlene Berger / Plan: Jadon Picket / Product Type: HMO /      Total Treatment Time (min): 45  Total Timed Codes (min): 45      Referring Melanie Villaseñor MD      ASSESSMENT/PLAN:  He does have some effusion along the lateral aspect of the shoulder with restricted posterior capsule mobility noted. Pain levels have remained fairly high the past couple of weeks without any specific mechanism of injury. Empty end feel in all planes of motion, particularly with flexion. Follow again later this week and see how he is doing      1. Acute pain of left shoulder  2. Stiffness of left shoulder joint  3. Status post rotator cuff repair        Return for Continued skilled physical therapy. SUBJECTIVE/OBJECTIVE:  Shoulder Pain    Reports continued pain along the lateral aspect of the shoulder. No significant relief found with ultrasound last visit  Physical Exam    Manual Therapy: (15minutes)  Grade II & III  arthrokinematic, and capsule mobilization with concurrent passive osteokinematic range of motion techniques were applied to the involved upper extremity. Medial stabilization of the scapular was employed as well as concomitant grade 2 and 3 glenohumeral distractions. Joint mobility techniques employed to restore correct mechanical mobility at the involved joints to allow restoration of range of motion and strength. Joints included the glenohumeral scapulothoracic and sternoclavicular regions.     Neuromuscular reeducation: (15 Minutes)  Balance posture and proprioceptive activities completed as below  neuromuscular down regulation to the myofascial soft tissue structures of the Glenohumeral and Scapulothoracic region applied with manual as well as instrument assisted techniques    Hold relax techniques were employed to assist with change in the neuromuscular firing pattern of the involved upperextremity for restoration of glenohumeral range of motion dissociated from the scapulothoracic joint. Therapeutic Exercise: (15 minutes)  Strength/Endurance/Exercises supervised and completed seS below          PT Exercise Log         Activity/Exercise   Date  4/5/2023     Aroldo Andrews on Total Gym Scap retraction x      T-bar external rotation x     Scapular retraction   x     Total Gym ball roll   x     T-bar flexion supine standing x     Over-the-door pulley x     Doorway external rotation x     Wall walk active assisted range of motion x     Dap seated active assisted range of motion 1.75 kg x     Passive range of motion involved upper extremity x           Range of Motion:  Passive forward elevation 135 degrees moderate scapular substitution  Passive lateral abduction 120  Passive external rotation 40 passive internal rotation 40    An electronic signature was used to authenticate this note.     -- AMARIS HusainT

## 2023-04-07 ENCOUNTER — OFFICE VISIT (OUTPATIENT)
Dept: ORTHOPEDIC SURGERY | Age: 68
End: 2023-04-07

## 2023-04-26 ENCOUNTER — OFFICE VISIT (OUTPATIENT)
Dept: ORTHOPEDIC SURGERY | Age: 68
End: 2023-04-26
Payer: COMMERCIAL

## 2023-04-26 DIAGNOSIS — M75.112 INCOMPLETE ROTATOR CUFF TEAR OR RUPTURE OF LEFT SHOULDER, NOT SPECIFIED AS TRAUMATIC: ICD-10-CM

## 2023-04-26 DIAGNOSIS — M25.612 STIFFNESS OF LEFT SHOULDER JOINT: ICD-10-CM

## 2023-04-26 DIAGNOSIS — Z98.890 STATUS POST ROTATOR CUFF REPAIR: ICD-10-CM

## 2023-04-26 DIAGNOSIS — M25.512 ACUTE PAIN OF LEFT SHOULDER: Primary | ICD-10-CM

## 2023-04-26 PROCEDURE — 97112 NEUROMUSCULAR REEDUCATION: CPT | Performed by: PHYSICAL THERAPIST

## 2023-04-26 PROCEDURE — 97110 THERAPEUTIC EXERCISES: CPT | Performed by: PHYSICAL THERAPIST

## 2023-04-26 PROCEDURE — 97140 MANUAL THERAPY 1/> REGIONS: CPT | Performed by: PHYSICAL THERAPIST

## 2023-04-26 NOTE — PROGRESS NOTES
PT DAILY TREATMENT NOTE    Patient Name: Azra Lloyd  Date:2023  : 1955  [x]  Patient  Verified  Payor: Collin Ramos / Plan: Sameer Prieto / Product Type: HMO /      Total Treatment Time (min): 65  Total Timed Codes (min): 65      Referring Perla Majano MD      ASSESSMENT/PLAN:  The patient's pain level and mobility are improved today since his last visit approximately 2 weeks ago. He did take a Medrol Dosepak had a week away on vacation. Does continue with capsule restriction most notably in the posterior inferior quadrants creating some rebound and continued impingement. We worked with hold relax stretching and continued mobilization to the glenohumeral joint. We did not advance functional strengthening today worked again with more range of motion in conjunction with scapular position with his activities and exercises. 1. Acute pain of left shoulder  2. Stiffness of left shoulder joint  3. Incomplete rotator cuff tear or rupture of left shoulder, not specified as traumatic  4. Status post rotator cuff repair        No follow-ups on file. SUBJECTIVE/OBJECTIVE:  Shoulder Pain  Patient reports at least 50% reduction in his pain following the Medrol Dosepak    Physical Exam  Posterior capsule restriction  Passive range of motion e see below      Manual Therapy: (15minutes)  Grade II & III  arthrokinematic, and capsule mobilization with concurrent passive osteokinematic range of motion techniques were applied to the involved upper extremity. Medial stabilization of the scapular was employed as well as concomitant grade 2 and 3 glenohumeral distractions. Joint mobility techniques employed to restore correct mechanical mobility at the involved joints to allow restoration of range of motion and strength. Joints included the glenohumeral scapulothoracic and sternoclavicular regions.     Neuromuscular reeducation: (15 Minutes)  posture and proprioceptive activities completed as below  neuromuscular down regulation to the myofascial soft tissue structures of the Glenohumeral and Scapulothoracic region applied with manual as well as instrument assisted techniques    Hold relax techniques were employed to assist with change in the neuromuscular firing pattern of the involved upperextremity for restoration of glenohumeral range of motion dissociated from the scapulothoracic joint. Therapeutic Exercise: (30 minutes)  Strength/Endurance/Exercises supervised and completed seS below          PT Exercise Log         Activity/Exercise   Date  4/26/2023     Shawn Petra on Total Gym Scap retraction x      T-bar external rotation x     Scapular retraction   x     Total Gym ball roll   x     T-bar flexion supine standing x     Over-the-door pulley x     Doorway external rotation x     Wall walk active assisted range of motion x     Dap seated active assisted range of motion 1.75 kg x     Passive range of motion involved upper extremity x     . OBJECTIVE  Modality (rationale):   []  E-Stim: type interferential_ x 10_ min     [x]att   []unatt   []w/US   [x]w/ice   []w/heat  []  Traction: []cerv   []pelvic   _ lbs x _ min     []pro   []sup   []int   []const  []  Ultrasound: [x]cont   []pulse    1.0_ W/cm2 x _8 min   [x]1MHz   []3MHz subdeltoid bursal region  []  Iontophoresis: []take home patch w/ dexamethazone    []40mA   []80mA                               []_ mA min w/: []dexamethazone   []other:_  []  Ice pack _  min     [] Hot pack _  min     [] Paraffin _  min  []  Other:        Range of Motion:  Passive forward elevation 135 degrees moderate scapular substitution active range 105  Passive lateral abduction 120 active range 95  Passive external rotation 45 passive internal rotation 55    An electronic signature was used to authenticate this note.     -- Kathyleen Ganser, MSPT

## 2023-05-02 ENCOUNTER — OFFICE VISIT (OUTPATIENT)
Dept: ORTHOPEDIC SURGERY | Age: 68
End: 2023-05-02
Payer: COMMERCIAL

## 2023-05-02 DIAGNOSIS — M25.612 STIFFNESS OF LEFT SHOULDER JOINT: ICD-10-CM

## 2023-05-02 DIAGNOSIS — M25.512 ACUTE PAIN OF LEFT SHOULDER: Primary | ICD-10-CM

## 2023-05-02 DIAGNOSIS — Z98.890 HISTORY OF REPAIR OF LEFT ROTATOR CUFF: ICD-10-CM

## 2023-05-02 DIAGNOSIS — Z98.890 STATUS POST ROTATOR CUFF REPAIR: ICD-10-CM

## 2023-05-02 DIAGNOSIS — M75.112 INCOMPLETE ROTATOR CUFF TEAR OR RUPTURE OF LEFT SHOULDER, NOT SPECIFIED AS TRAUMATIC: ICD-10-CM

## 2023-05-02 PROCEDURE — 97112 NEUROMUSCULAR REEDUCATION: CPT | Performed by: PHYSICAL THERAPIST

## 2023-05-02 PROCEDURE — 97140 MANUAL THERAPY 1/> REGIONS: CPT | Performed by: PHYSICAL THERAPIST

## 2023-05-02 PROCEDURE — 97110 THERAPEUTIC EXERCISES: CPT | Performed by: PHYSICAL THERAPIST

## 2023-05-04 ENCOUNTER — OFFICE VISIT (OUTPATIENT)
Dept: ORTHOPEDIC SURGERY | Age: 68
End: 2023-05-04

## 2023-05-04 DIAGNOSIS — M75.112 INCOMPLETE ROTATOR CUFF TEAR OR RUPTURE OF LEFT SHOULDER, NOT SPECIFIED AS TRAUMATIC: ICD-10-CM

## 2023-05-04 DIAGNOSIS — Z98.890 STATUS POST ROTATOR CUFF REPAIR: ICD-10-CM

## 2023-05-04 DIAGNOSIS — M25.612 STIFFNESS OF LEFT SHOULDER JOINT: ICD-10-CM

## 2023-05-04 DIAGNOSIS — Z98.890 HISTORY OF REPAIR OF LEFT ROTATOR CUFF: ICD-10-CM

## 2023-05-04 DIAGNOSIS — M25.512 ACUTE PAIN OF LEFT SHOULDER: Primary | ICD-10-CM

## 2023-05-18 RX ORDER — DICLOFENAC SODIUM 75 MG/1
75 TABLET, DELAYED RELEASE ORAL 2 TIMES DAILY
COMMUNITY
Start: 2023-01-04

## 2023-05-18 RX ORDER — METHYLPREDNISOLONE 4 MG/1
TABLET ORAL
COMMUNITY
Start: 2023-04-13

## 2024-05-13 ENCOUNTER — HOSPITAL ENCOUNTER (INPATIENT)
Facility: HOSPITAL | Age: 69
LOS: 1 days | Discharge: HOME | DRG: 287 | End: 2024-05-14
Attending: EMERGENCY MEDICINE | Admitting: STUDENT IN AN ORGANIZED HEALTH CARE EDUCATION/TRAINING PROGRAM
Payer: COMMERCIAL

## 2024-05-13 DIAGNOSIS — I24.9 ACS (ACUTE CORONARY SYNDROME) (HCC): ICD-10-CM

## 2024-05-13 DIAGNOSIS — I21.3 STEMI (ST ELEVATION MYOCARDIAL INFARCTION) (HCC): ICD-10-CM

## 2024-05-13 DIAGNOSIS — I48.19 PERSISTENT ATRIAL FIBRILLATION (HCC): ICD-10-CM

## 2024-05-13 DIAGNOSIS — I48.91 ATRIAL FIBRILLATION WITH RVR (HCC): Primary | ICD-10-CM

## 2024-05-13 PROBLEM — R07.9 CHEST PAIN: Status: ACTIVE | Noted: 2024-05-13

## 2024-05-13 LAB
ACT BLD: 266 SECS (ref 79–138)
ALBUMIN SERPL-MCNC: 3.6 G/DL (ref 3.5–5)
ALBUMIN/GLOB SERPL: 1 (ref 1.1–2.2)
ALP SERPL-CCNC: 76 U/L (ref 45–117)
ALT SERPL-CCNC: 29 U/L (ref 12–78)
ANION GAP SERPL CALC-SCNC: 2 MMOL/L (ref 5–15)
AST SERPL-CCNC: 20 U/L (ref 15–37)
BASOPHILS # BLD: 0.1 K/UL (ref 0–0.1)
BASOPHILS NFR BLD: 1 % (ref 0–1)
BILIRUB SERPL-MCNC: 0.6 MG/DL (ref 0.2–1)
BUN SERPL-MCNC: 22 MG/DL (ref 6–20)
BUN/CREAT SERPL: 21 (ref 12–20)
CALCIUM SERPL-MCNC: 9.3 MG/DL (ref 8.5–10.1)
CHLORIDE SERPL-SCNC: 110 MMOL/L (ref 97–108)
CO2 SERPL-SCNC: 26 MMOL/L (ref 21–32)
COMMENT:: NORMAL
CREAT SERPL-MCNC: 1.03 MG/DL (ref 0.7–1.3)
DIFFERENTIAL METHOD BLD: ABNORMAL
EKG DIAGNOSIS: NORMAL
EKG DIAGNOSIS: NORMAL
EKG Q-T INTERVAL: 298 MS
EKG Q-T INTERVAL: 390 MS
EKG QRS DURATION: 110 MS
EKG QRS DURATION: 130 MS
EKG QTC CALCULATION (BAZETT): 475 MS
EKG QTC CALCULATION (BAZETT): 477 MS
EKG R AXIS: 2 DEGREES
EKG R AXIS: 224 DEGREES
EKG T AXIS: 53 DEGREES
EKG T AXIS: 96 DEGREES
EKG VENTRICULAR RATE: 153 BPM
EKG VENTRICULAR RATE: 90 BPM
EOSINOPHIL # BLD: 0.1 K/UL (ref 0–0.4)
EOSINOPHIL NFR BLD: 2 % (ref 0–7)
ERYTHROCYTE [DISTWIDTH] IN BLOOD BY AUTOMATED COUNT: 12.5 % (ref 11.5–14.5)
GLOBULIN SER CALC-MCNC: 3.5 G/DL (ref 2–4)
GLUCOSE SERPL-MCNC: 109 MG/DL (ref 65–100)
HCT VFR BLD AUTO: 42.1 % (ref 36.6–50.3)
HGB BLD-MCNC: 15.6 G/DL (ref 12.1–17)
IMM GRANULOCYTES # BLD AUTO: 0 K/UL (ref 0–0.04)
IMM GRANULOCYTES NFR BLD AUTO: 0 % (ref 0–0.5)
LYMPHOCYTES # BLD: 1.6 K/UL (ref 0.8–3.5)
LYMPHOCYTES NFR BLD: 17 % (ref 12–49)
MCH RBC QN AUTO: 32.6 PG (ref 26–34)
MCHC RBC AUTO-ENTMCNC: 37.1 G/DL (ref 30–36.5)
MCV RBC AUTO: 88.1 FL (ref 80–99)
MONOCYTES # BLD: 1 K/UL (ref 0–1)
MONOCYTES NFR BLD: 11 % (ref 5–13)
NEUTS SEG # BLD: 6.6 K/UL (ref 1.8–8)
NEUTS SEG NFR BLD: 69 % (ref 32–75)
NRBC # BLD: 0 K/UL (ref 0–0.01)
NRBC BLD-RTO: 0 PER 100 WBC
PLATELET # BLD AUTO: 199 K/UL (ref 150–400)
PMV BLD AUTO: 10.1 FL (ref 8.9–12.9)
POTASSIUM SERPL-SCNC: 3.9 MMOL/L (ref 3.5–5.1)
PROT SERPL-MCNC: 7.1 G/DL (ref 6.4–8.2)
RBC # BLD AUTO: 4.78 M/UL (ref 4.1–5.7)
SODIUM SERPL-SCNC: 138 MMOL/L (ref 136–145)
SPECIMEN HOLD: NORMAL
TROPONIN I SERPL HS-MCNC: 14 NG/L (ref 0–76)
WBC # BLD AUTO: 9.3 K/UL (ref 4.1–11.1)

## 2024-05-13 PROCEDURE — 85347 COAGULATION TIME ACTIVATED: CPT

## 2024-05-13 PROCEDURE — B2111ZZ FLUOROSCOPY OF MULTIPLE CORONARY ARTERIES USING LOW OSMOLAR CONTRAST: ICD-10-PCS | Performed by: INTERNAL MEDICINE

## 2024-05-13 PROCEDURE — 6360000004 HC RX CONTRAST MEDICATION: Performed by: INTERNAL MEDICINE

## 2024-05-13 PROCEDURE — 99152 MOD SED SAME PHYS/QHP 5/>YRS: CPT | Performed by: INTERNAL MEDICINE

## 2024-05-13 PROCEDURE — 99285 EMERGENCY DEPT VISIT HI MDM: CPT

## 2024-05-13 PROCEDURE — 76937 US GUIDE VASCULAR ACCESS: CPT | Performed by: INTERNAL MEDICINE

## 2024-05-13 PROCEDURE — 2709999900 HC NON-CHARGEABLE SUPPLY: Performed by: INTERNAL MEDICINE

## 2024-05-13 PROCEDURE — 2580000003 HC RX 258: Performed by: STUDENT IN AN ORGANIZED HEALTH CARE EDUCATION/TRAINING PROGRAM

## 2024-05-13 PROCEDURE — 6370000000 HC RX 637 (ALT 250 FOR IP): Performed by: INTERNAL MEDICINE

## 2024-05-13 PROCEDURE — 93005 ELECTROCARDIOGRAM TRACING: CPT | Performed by: STUDENT IN AN ORGANIZED HEALTH CARE EDUCATION/TRAINING PROGRAM

## 2024-05-13 PROCEDURE — 99291 CRITICAL CARE FIRST HOUR: CPT

## 2024-05-13 PROCEDURE — C1713 ANCHOR/SCREW BN/BN,TIS/BN: HCPCS | Performed by: INTERNAL MEDICINE

## 2024-05-13 PROCEDURE — B2151ZZ FLUOROSCOPY OF LEFT HEART USING LOW OSMOLAR CONTRAST: ICD-10-PCS | Performed by: INTERNAL MEDICINE

## 2024-05-13 PROCEDURE — 2500000003 HC RX 250 WO HCPCS: Performed by: INTERNAL MEDICINE

## 2024-05-13 PROCEDURE — 36415 COLL VENOUS BLD VENIPUNCTURE: CPT

## 2024-05-13 PROCEDURE — 6360000002 HC RX W HCPCS: Performed by: EMERGENCY MEDICINE

## 2024-05-13 PROCEDURE — 6370000000 HC RX 637 (ALT 250 FOR IP): Performed by: STUDENT IN AN ORGANIZED HEALTH CARE EDUCATION/TRAINING PROGRAM

## 2024-05-13 PROCEDURE — 93005 ELECTROCARDIOGRAM TRACING: CPT | Performed by: EMERGENCY MEDICINE

## 2024-05-13 PROCEDURE — C1887 CATHETER, GUIDING: HCPCS | Performed by: INTERNAL MEDICINE

## 2024-05-13 PROCEDURE — 6360000002 HC RX W HCPCS: Performed by: NURSE PRACTITIONER

## 2024-05-13 PROCEDURE — C1894 INTRO/SHEATH, NON-LASER: HCPCS | Performed by: INTERNAL MEDICINE

## 2024-05-13 PROCEDURE — 84484 ASSAY OF TROPONIN QUANT: CPT

## 2024-05-13 PROCEDURE — 2060000000 HC ICU INTERMEDIATE R&B

## 2024-05-13 PROCEDURE — 2580000003 HC RX 258: Performed by: INTERNAL MEDICINE

## 2024-05-13 PROCEDURE — 96374 THER/PROPH/DIAG INJ IV PUSH: CPT

## 2024-05-13 PROCEDURE — 2500000003 HC RX 250 WO HCPCS: Performed by: EMERGENCY MEDICINE

## 2024-05-13 PROCEDURE — 4A023N7 MEASUREMENT OF CARDIAC SAMPLING AND PRESSURE, LEFT HEART, PERCUTANEOUS APPROACH: ICD-10-PCS | Performed by: INTERNAL MEDICINE

## 2024-05-13 PROCEDURE — 80053 COMPREHEN METABOLIC PANEL: CPT

## 2024-05-13 PROCEDURE — 93458 L HRT ARTERY/VENTRICLE ANGIO: CPT | Performed by: INTERNAL MEDICINE

## 2024-05-13 PROCEDURE — 93005 ELECTROCARDIOGRAM TRACING: CPT | Performed by: INTERNAL MEDICINE

## 2024-05-13 PROCEDURE — 6360000002 HC RX W HCPCS: Performed by: INTERNAL MEDICINE

## 2024-05-13 PROCEDURE — 85025 COMPLETE CBC W/AUTO DIFF WBC: CPT

## 2024-05-13 PROCEDURE — 96375 TX/PRO/DX INJ NEW DRUG ADDON: CPT

## 2024-05-13 RX ORDER — ASPIRIN 81 MG/1
81 TABLET ORAL DAILY
Status: DISCONTINUED | OUTPATIENT
Start: 2024-05-13 | End: 2024-05-14 | Stop reason: HOSPADM

## 2024-05-13 RX ORDER — VERAPAMIL HYDROCHLORIDE 2.5 MG/ML
INJECTION, SOLUTION INTRAVENOUS PRN
Status: DISCONTINUED | OUTPATIENT
Start: 2024-05-13 | End: 2024-05-13 | Stop reason: HOSPADM

## 2024-05-13 RX ORDER — ROSUVASTATIN CALCIUM 10 MG/1
20 TABLET, COATED ORAL NIGHTLY
Status: DISCONTINUED | OUTPATIENT
Start: 2024-05-13 | End: 2024-05-14 | Stop reason: HOSPADM

## 2024-05-13 RX ORDER — SODIUM CHLORIDE 0.9 % (FLUSH) 0.9 %
5-40 SYRINGE (ML) INJECTION PRN
Status: DISCONTINUED | OUTPATIENT
Start: 2024-05-13 | End: 2024-05-14 | Stop reason: HOSPADM

## 2024-05-13 RX ORDER — FENTANYL CITRATE 50 UG/ML
INJECTION, SOLUTION INTRAMUSCULAR; INTRAVENOUS PRN
Status: DISCONTINUED | OUTPATIENT
Start: 2024-05-13 | End: 2024-05-13 | Stop reason: HOSPADM

## 2024-05-13 RX ORDER — SODIUM CHLORIDE 9 MG/ML
INJECTION, SOLUTION INTRAVENOUS PRN
Status: DISCONTINUED | OUTPATIENT
Start: 2024-05-13 | End: 2024-05-14 | Stop reason: HOSPADM

## 2024-05-13 RX ORDER — LORAZEPAM 1 MG/1
0.5 TABLET ORAL ONCE
Status: COMPLETED | OUTPATIENT
Start: 2024-05-13 | End: 2024-05-13

## 2024-05-13 RX ORDER — LIDOCAINE HYDROCHLORIDE 10 MG/ML
INJECTION, SOLUTION INFILTRATION; PERINEURAL PRN
Status: DISCONTINUED | OUTPATIENT
Start: 2024-05-13 | End: 2024-05-13 | Stop reason: HOSPADM

## 2024-05-13 RX ORDER — LOSARTAN POTASSIUM 50 MG/1
50 TABLET ORAL DAILY
Status: ON HOLD | COMMUNITY
End: 2024-05-14 | Stop reason: HOSPADM

## 2024-05-13 RX ORDER — ACETAMINOPHEN 325 MG/1
650 TABLET ORAL EVERY 6 HOURS PRN
Status: DISCONTINUED | OUTPATIENT
Start: 2024-05-13 | End: 2024-05-14 | Stop reason: HOSPADM

## 2024-05-13 RX ORDER — AMLODIPINE BESYLATE 5 MG/1
5 TABLET ORAL DAILY
Status: ON HOLD | COMMUNITY
End: 2024-05-14 | Stop reason: HOSPADM

## 2024-05-13 RX ORDER — ASPIRIN 81 MG/1
81 TABLET ORAL DAILY
COMMUNITY

## 2024-05-13 RX ORDER — PANTOPRAZOLE SODIUM 40 MG/1
40 TABLET, DELAYED RELEASE ORAL DAILY
COMMUNITY

## 2024-05-13 RX ORDER — ROSUVASTATIN CALCIUM 40 MG/1
40 TABLET, COATED ORAL EVERY EVENING
COMMUNITY

## 2024-05-13 RX ORDER — 0.9 % SODIUM CHLORIDE 0.9 %
INTRAVENOUS SOLUTION INTRAVENOUS CONTINUOUS PRN
Status: COMPLETED | OUTPATIENT
Start: 2024-05-13 | End: 2024-05-13

## 2024-05-13 RX ORDER — ESCITALOPRAM OXALATE 10 MG/1
10 TABLET ORAL DAILY
COMMUNITY

## 2024-05-13 RX ORDER — MORPHINE SULFATE 2 MG/ML
1 INJECTION, SOLUTION INTRAMUSCULAR; INTRAVENOUS ONCE
Status: COMPLETED | OUTPATIENT
Start: 2024-05-13 | End: 2024-05-13

## 2024-05-13 RX ORDER — HEPARIN SODIUM 1000 [USP'U]/ML
5000 INJECTION, SOLUTION INTRAVENOUS; SUBCUTANEOUS
Status: COMPLETED | OUTPATIENT
Start: 2024-05-13 | End: 2024-05-13

## 2024-05-13 RX ORDER — SODIUM CHLORIDE 0.9 % (FLUSH) 0.9 %
5-40 SYRINGE (ML) INJECTION EVERY 12 HOURS SCHEDULED
Status: DISCONTINUED | OUTPATIENT
Start: 2024-05-13 | End: 2024-05-14 | Stop reason: HOSPADM

## 2024-05-13 RX ORDER — MIDAZOLAM HYDROCHLORIDE 1 MG/ML
INJECTION INTRAMUSCULAR; INTRAVENOUS PRN
Status: DISCONTINUED | OUTPATIENT
Start: 2024-05-13 | End: 2024-05-13 | Stop reason: HOSPADM

## 2024-05-13 RX ORDER — METOPROLOL TARTRATE 1 MG/ML
5 INJECTION, SOLUTION INTRAVENOUS ONCE
Status: COMPLETED | OUTPATIENT
Start: 2024-05-13 | End: 2024-05-13

## 2024-05-13 RX ORDER — POLYETHYLENE GLYCOL 3350 17 G/17G
17 POWDER, FOR SOLUTION ORAL DAILY PRN
Status: DISCONTINUED | OUTPATIENT
Start: 2024-05-13 | End: 2024-05-14 | Stop reason: HOSPADM

## 2024-05-13 RX ORDER — ONDANSETRON 4 MG/1
4 TABLET, ORALLY DISINTEGRATING ORAL EVERY 8 HOURS PRN
Status: DISCONTINUED | OUTPATIENT
Start: 2024-05-13 | End: 2024-05-14 | Stop reason: HOSPADM

## 2024-05-13 RX ORDER — ACETAMINOPHEN 650 MG/1
650 SUPPOSITORY RECTAL EVERY 6 HOURS PRN
Status: DISCONTINUED | OUTPATIENT
Start: 2024-05-13 | End: 2024-05-14 | Stop reason: HOSPADM

## 2024-05-13 RX ORDER — ONDANSETRON 2 MG/ML
4 INJECTION INTRAMUSCULAR; INTRAVENOUS EVERY 6 HOURS PRN
Status: DISCONTINUED | OUTPATIENT
Start: 2024-05-13 | End: 2024-05-14 | Stop reason: HOSPADM

## 2024-05-13 RX ORDER — HEPARIN SODIUM 1000 [USP'U]/ML
INJECTION, SOLUTION INTRAVENOUS; SUBCUTANEOUS PRN
Status: DISCONTINUED | OUTPATIENT
Start: 2024-05-13 | End: 2024-05-13 | Stop reason: HOSPADM

## 2024-05-13 RX ADMIN — METOPROLOL TARTRATE 25 MG: 25 TABLET, FILM COATED ORAL at 14:08

## 2024-05-13 RX ADMIN — METOPROLOL TARTRATE 5 MG: 1 INJECTION, SOLUTION INTRAVENOUS at 12:33

## 2024-05-13 RX ADMIN — ALUMINUM HYDROXIDE, MAGNESIUM HYDROXIDE, AND SIMETHICONE 40 ML: 1200; 120; 1200 SUSPENSION ORAL at 17:03

## 2024-05-13 RX ADMIN — ACETAMINOPHEN 650 MG: 325 TABLET ORAL at 21:28

## 2024-05-13 RX ADMIN — ROSUVASTATIN 20 MG: 10 TABLET, FILM COATED ORAL at 21:30

## 2024-05-13 RX ADMIN — APIXABAN 5 MG: 5 TABLET, FILM COATED ORAL at 19:25

## 2024-05-13 RX ADMIN — METOPROLOL TARTRATE 25 MG: 25 TABLET, FILM COATED ORAL at 21:30

## 2024-05-13 RX ADMIN — HEPARIN SODIUM 5000 UNITS: 1000 INJECTION INTRAVENOUS; SUBCUTANEOUS at 12:36

## 2024-05-13 RX ADMIN — LORAZEPAM 0.5 MG: 0.5 TABLET ORAL at 17:03

## 2024-05-13 RX ADMIN — MORPHINE SULFATE 1 MG: 2 INJECTION, SOLUTION INTRAMUSCULAR; INTRAVENOUS at 21:29

## 2024-05-13 RX ADMIN — SODIUM CHLORIDE, PRESERVATIVE FREE 10 ML: 5 INJECTION INTRAVENOUS at 21:30

## 2024-05-13 ASSESSMENT — PAIN SCALES - GENERAL
PAINLEVEL_OUTOF10: 7
PAINLEVEL_OUTOF10: 3
PAINLEVEL_OUTOF10: 7
PAINLEVEL_OUTOF10: 7
PAINLEVEL_OUTOF10: 3
PAINLEVEL_OUTOF10: 7
PAINLEVEL_OUTOF10: 6

## 2024-05-13 ASSESSMENT — PAIN DESCRIPTION - LOCATION
LOCATION: CHEST

## 2024-05-13 ASSESSMENT — PAIN DESCRIPTION - DESCRIPTORS: DESCRIPTORS: ACHING;HEAVINESS;DISCOMFORT

## 2024-05-13 ASSESSMENT — PAIN - FUNCTIONAL ASSESSMENT: PAIN_FUNCTIONAL_ASSESSMENT: ACTIVITIES ARE NOT PREVENTED

## 2024-05-13 ASSESSMENT — PAIN DESCRIPTION - ORIENTATION
ORIENTATION: MID
ORIENTATION: MID

## 2024-05-13 NOTE — PROGRESS NOTES
TRANSFER - IN REPORT:    Verbal report received from [facility] on Keegan Cervantes  being received from cardiac catheterization for routine progression of patient care. Report consisted of patient’s Situation, Background, Assessment and Recommendations(SBAR). Information from the following report(s) Procedure Summary was reviewed with the receiving clinician. Opportunity for questions and clarification was provided. Assessment completed upon patient’s arrival to Cardiac Cath Lab RECOVERY AREA and care assumed.       Cardiac Cath Lab Recovery Arrival Note:    Keegan Cervantes arrived to [facility] recovery area.  Patient procedure= cardiac catheterization. Patient on cardiac monitor, non-invasive blood pressure, SPO2 monitor. On room air .IV  of Normal saline on pump at 777 ml/hr. Patient getting IV fluid bolus.Patient status doing well without problems. Patient is A&Ox 4. Patient reports no pain.    PROCEDURE SITE CHECK:    Procedure site:without any bleeding and no hematoma, no pain/discomfort reported at procedure site.     No change in patient status. Continue to monitor patient and status.    1330 Patient eating food and taking po liquids without nausea and tolerating it well. Wife in to see patient.     1420 Patient stood to void. Is steady on his feet. No complaints of nausea.   Patient states he is still having some chest discomfort. Offered Tylenol but declined. Cardiac catheterization diagnostic with no intervention needed.   1515 Hospitalist in to see patient.    1600 TR band removed and tegederm dressing applied.  1640 Patient complaining of 7 out of 10 chest pain; EKG done. Dr. Degroot notified. GI cocktail ordered and administered (see MAR). Patient also given Ativan (see MAR). Vital signs stable (see Flowsheet).

## 2024-05-13 NOTE — H&P
History and Physical    Date of Service:  5/13/2024  Primary Care Provider: Gail Lynn MD  Source of information: The patient and Chart review    Chief Complaint: Chest Pain      History of Presenting Illness:   Keegan Cervantes is a 69 y.o. male who presents with chest pain, afib with RVR.    This is a 69-year-old  male with past medical history of GERD, hypertension, coronary artery disease status post stent placement, hyperlipidemia, who comes in for the above.  Patient reports that he has been working on getting his yard mowed the last few days and reports that he was moving very slowly.  He said that when taking a break at 1 point, he felt like his heart was jumping all over the place and then this was followed by chest pressure/heaviness.  He reports that the chest pressure was kind of in the middle of the chest but would move around at times.  He reports that this pain was intermittent and relatively severe, with no real accompanying diaphoresis or other symptoms at that time.  He reported that due to ongoing chest discomfort he presented to his cardiologist's office and EKG showed ST elevations that were more prominent in the precordial leads.  The patient was brought into the ER as a code STEMI.  Bedside echocardiogram was shown to have hypokinesis of the anterior and anteroseptal walls as well.  Patient taken to the Cath Lab and his heart catheterization was found to be clean.    Patient was also found to be in new A-fib that was in RVR.  He was started on metoprolol and Eliquis.  Hospitalist service asked to admit the patient as cardiology plans to do it DC cardioversion with RUPAL tomorrow.    The patient denies any headache, blurry vision, sore throat, trouble swallowing, trouble with speech, chest pain, SOB, cough, fever, chills, N/V/D, abd pain, urinary symptoms, constipation, recent travels, sick contacts, focal or generalized neurological symptoms, falls, injuries, rashes,

## 2024-05-13 NOTE — ED TRIAGE NOTES
Pt arrived via EMS with CC chest pain intermittent over the past 4 days worsening upon today. EMS activated STEMI protocol. Dr. Degroot at bedside upon arrival. Aox4.

## 2024-05-13 NOTE — CONSULTS
hours.    Invalid input(s): \"CKNDX\", \"TROIQ\"  Recent Labs     05/13/24  1237      K 3.9   *   CO2 26   BUN 22*   WBC 9.3   HGB 15.6   HCT 42.1        Recent Labs     05/13/24  1237   GLOB 3.5     No results for input(s): \"INR\", \"APTT\" in the last 72 hours.    Invalid input(s): \"PTP\"   No results for input(s): \"TIBC\", \"FERR\" in the last 72 hours.    Invalid input(s): \"FE\", \"PSAT\"   No results found for: \"GLUCPOC\"    No results found for: \"CHOL\", \"CHOLPOCT\", \"CHLST\", \"CHOLV\", \"HDL\", \"HDLPOC\", \"HDLC\", \"LDL\", \"VLDLC\", \"VLDL\"     Recent Results (from the past 24 hour(s))   EKG 12 Lead    Collection Time: 05/13/24 12:26 PM   Result Value Ref Range    Ventricular Rate 153 BPM    QRS Duration 110 ms    Q-T Interval 298 ms    QTc Calculation (Bazett) 475 ms    R Axis 224 degrees    T Axis 53 degrees    Diagnosis       ** Critical Test Result: High HR , STEMI  ** Suspect arm lead reversal, interpretation assumes no reversal  Atrial fibrillation with rapid ventricular response  Inferior infarct , age undetermined  Anterolateral infarct (cited on or before 13-MAY-2024)  ** ** ACUTE MI / STEMI ** **  Abnormal ECG  When compared with ECG of 11-OCT-2012 11:14,  Significant changes have occurred     CBC with Auto Differential    Collection Time: 05/13/24 12:37 PM   Result Value Ref Range    WBC 9.3 4.1 - 11.1 K/uL    RBC 4.78 4.10 - 5.70 M/uL    Hemoglobin 15.6 12.1 - 17.0 g/dL    Hematocrit 42.1 36.6 - 50.3 %    MCV 88.1 80.0 - 99.0 FL    MCH 32.6 26.0 - 34.0 PG    MCHC 37.1 (H) 30.0 - 36.5 g/dL    RDW 12.5 11.5 - 14.5 %    Platelets 199 150 - 400 K/uL    MPV 10.1 8.9 - 12.9 FL    Nucleated RBCs 0.0 0  WBC    nRBC 0.00 0.00 - 0.01 K/uL    Neutrophils % 69 32 - 75 %    Lymphocytes % 17 12 - 49 %    Monocytes % 11 5 - 13 %    Eosinophils % 2 0 - 7 %    Basophils % 1 0 - 1 %    Immature Granulocytes % 0 0.0 - 0.5 %    Neutrophils Absolute 6.6 1.8 - 8.0 K/UL    Lymphocytes Absolute 1.6 0.8 - 3.5 K/UL

## 2024-05-13 NOTE — ED PROVIDER NOTES
- 138 SECS Final    Ventricular Rate 05/13/2024 90  BPM Final    QRS Duration 05/13/2024 130  ms Final    Q-T Interval 05/13/2024 390  ms Final    QTc Calculation (Bazett) 05/13/2024 477  ms Final    R Axis 05/13/2024 2  degrees Final    T [place] 05/13/2024 96  degrees Final    Diagnosis 05/13/2024    Final                    Value:Atrial fibrillation with premature ventricular or aberrantly conducted   complexes  Left bundle branch block  Abnormal ECG  When compared with ECG of 13-MAY-2024 12:26,  Vent. rate has decreased BY  63 BPM  Left bundle branch block is now present  Criteria for Anterior infarct are no longer present  Criteria for Anterolateral infarct are no longer present  Criteria for Inferior infarct are no longer present  Confirmed by Edy Degroot ([id number]) on 5/13/2024 4:52:20 PM           EMERGENCY DEPARTMENT COURSE and DIFFERENTIAL DIAGNOSIS/MDM:   Vitals:    Vitals:    05/13/24 1845 05/13/24 1900 05/13/24 1915 05/13/24 1930   BP: 125/78 118/75 124/74 125/76   Pulse: 85 85 82 89   Resp: 25 20 17 16   SpO2: 92% 94% 91% 91%           Medical Decision Making  69M w/ hx HTN, HLD, CAD s/p coronary stents p/w 4d chest pain. Pt nontoxic, no resp distress or hypoxia. -150s w/ stable BP.  EMS EKG showing AF w/ RVR w/ LBBB and inferior STD. These resolved after repeat EKG here but still AF RVR. STEMI alert activated pre-hospital. Dr Degroot evaluated pt on arrival. We gave metoprolol and heparin bolus. Will go to cath lab now. I also spoke w/ critical care team for admission.      Amount and/or Complexity of Data Reviewed  Independent Historian: EMS  Labs: ordered. Decision-making details documented in ED Course.  ECG/medicine tests: ordered and independent interpretation performed. Decision-making details documented in ED Course.    Risk  Prescription drug management.  Decision regarding hospitalization.            ED Course            CONSULTS:  IP CONSULT TO INTENSIVIST  IP CONSULT TO HOSPITALIST  IP

## 2024-05-13 NOTE — PROGRESS NOTES
1715:  Report received from MAYRA Barber RN. Right radial without bleeding and no hematoma. Patient c/o mid pressure chest pain 7/10. EKG obtained and medications just given by CALVIN Hitchcock. See MAR. Patient denied nitro when offered, patient states it doesn't help.  1912: RN messaged Dr. Degroot about the patient's complaint of continuous pressure chest pain unresolved so far. Patient's VS stable. Waiting for M.D.'s response.   0820:  Reviewed home medications with patient and patient states he would like pain medicine for the chest pain. RN sent the hospital team a perfect serve message. Waiting for M.D. response. About to take patient to room.  2031:  Bedside report given to CALVIN Fernandez in IMCU. Right radial, no bleeding and no hematoma.

## 2024-05-13 NOTE — BRIEF OP NOTE
Brief Postoperative Note      Patient: Keegan Cervantes  YOB: 1955  MRN: [id number]    Date of Procedure: 5/13/2024    Pre-Op Diagnosis Codes:     * STEMI (ST elevation myocardial infarction) ([facility]) [I21.3]    Post-Op Diagnosis: Non-obstructive CAD, new onset AF with RVR       Procedure(s):  Left heart cath / coronary angiography    Surgeon(s):  Edy Degroot MD    Assistant:  * No surgical staff found *    Anesthesia: * No anesthesia type entered *    Estimated Blood Loss (mL): 10 mL    Complications: None    Specimens:   * No specimens in log *    Implants:  * No implants in log *      Drains: * No LDAs found *    Findings:  No high grade obstructive CAD.  Admit to hospitalist for new onset AF with RVR.  Recommendations in note.      Electronically signed by Edy Degroot MD on 5/13/2024 at 1:31 PM

## 2024-05-14 ENCOUNTER — APPOINTMENT (OUTPATIENT)
Facility: HOSPITAL | Age: 69
DRG: 287 | End: 2024-05-14
Attending: INTERNAL MEDICINE
Payer: COMMERCIAL

## 2024-05-14 ENCOUNTER — HOSPITAL ENCOUNTER (INPATIENT)
Facility: HOSPITAL | Age: 69
Discharge: HOME | DRG: 287 | End: 2024-05-16
Attending: INTERNAL MEDICINE
Payer: COMMERCIAL

## 2024-05-14 ENCOUNTER — ANESTHESIA (OUTPATIENT)
Facility: HOSPITAL | Age: 69
DRG: 287 | End: 2024-05-14
Payer: COMMERCIAL

## 2024-05-14 ENCOUNTER — ANESTHESIA EVENT (OUTPATIENT)
Facility: HOSPITAL | Age: 69
DRG: 287 | End: 2024-05-14
Payer: COMMERCIAL

## 2024-05-14 VITALS
HEIGHT: 71 IN | HEART RATE: 70 BPM | WEIGHT: 204 LBS | TEMPERATURE: 98 F | DIASTOLIC BLOOD PRESSURE: 61 MMHG | BODY MASS INDEX: 28.56 KG/M2 | OXYGEN SATURATION: 94 % | SYSTOLIC BLOOD PRESSURE: 132 MMHG | RESPIRATION RATE: 18 BRPM

## 2024-05-14 VITALS
OXYGEN SATURATION: 95 % | SYSTOLIC BLOOD PRESSURE: 114 MMHG | DIASTOLIC BLOOD PRESSURE: 72 MMHG | HEART RATE: 71 BPM | RESPIRATION RATE: 12 BRPM

## 2024-05-14 PROBLEM — R07.9 CHEST PAIN: Status: RESOLVED | Noted: 2024-05-13 | Resolved: 2024-05-14

## 2024-05-14 LAB
ABO + RH BLD: NORMAL
ANION GAP SERPL CALC-SCNC: 5 MMOL/L (ref 5–15)
BASOPHILS # BLD: 0 K/UL (ref 0–0.1)
BASOPHILS NFR BLD: 1 % (ref 0–1)
BLOOD GROUP ANTIBODIES SERPL: NORMAL
BUN SERPL-MCNC: 17 MG/DL (ref 6–20)
BUN/CREAT SERPL: 17 (ref 12–20)
CALCIUM SERPL-MCNC: 8.8 MG/DL (ref 8.5–10.1)
CO2 SERPL-SCNC: 23 MMOL/L (ref 21–32)
CREAT SERPL-MCNC: 0.98 MG/DL (ref 0.7–1.3)
ECHO AO ROOT DIAM: 2.9 CM
ECHO AO ROOT INDEX: 1.36 CM/M2
ECHO AV AREA PEAK VELOCITY: 2.5 CM2
ECHO AV AREA VTI: 2.1 CM2
ECHO AV AREA/BSA PEAK VELOCITY: 1.2 CM2/M2
ECHO AV AREA/BSA VTI: 1 CM2/M2
ECHO AV MEAN GRADIENT: 5 MMHG
ECHO AV MEAN VELOCITY: 1.1 M/S
ECHO AV PEAK GRADIENT: 7 MMHG
ECHO AV PEAK VELOCITY: 1.4 M/S
ECHO AV VELOCITY RATIO: 0.71
ECHO AV VTI: 27 CM
ECHO BSA: 2.15 M2
ECHO EST RA PRESSURE: 3 MMHG
ECHO LA DIAMETER INDEX: 1.74 CM/M2
ECHO LA DIAMETER: 3.7 CM
ECHO LA TO AORTIC ROOT RATIO: 1.28
ECHO LA VOL A-L A2C: 42 ML (ref 18–58)
ECHO LA VOL A-L A4C: 53 ML (ref 18–58)
ECHO LA VOL MOD A2C: 40 ML (ref 18–58)
ECHO LA VOL MOD A4C: 48 ML (ref 18–58)
ECHO LA VOLUME AREA LENGTH: 50 ML
ECHO LA VOLUME INDEX A-L A2C: 20 ML/M2 (ref 16–34)
ECHO LA VOLUME INDEX A-L A4C: 25 ML/M2 (ref 16–34)
ECHO LA VOLUME INDEX AREA LENGTH: 23 ML/M2 (ref 16–34)
ECHO LA VOLUME INDEX MOD A2C: 19 ML/M2 (ref 16–34)
ECHO LA VOLUME INDEX MOD A4C: 23 ML/M2 (ref 16–34)
ECHO LV EDV A2C: 119 ML
ECHO LV EDV A4C: 120 ML
ECHO LV EDV BP: 122 ML (ref 67–155)
ECHO LV EDV INDEX A4C: 56 ML/M2
ECHO LV EDV INDEX BP: 57 ML/M2
ECHO LV EDV NDEX A2C: 56 ML/M2
ECHO LV EJECTION FRACTION A2C: 53 %
ECHO LV EJECTION FRACTION A4C: 54 %
ECHO LV EJECTION FRACTION BIPLANE: 53 % (ref 55–100)
ECHO LV ESV A2C: 56 ML
ECHO LV ESV A4C: 55 ML
ECHO LV ESV BP: 57 ML (ref 22–58)
ECHO LV ESV INDEX A2C: 26 ML/M2
ECHO LV ESV INDEX A4C: 26 ML/M2
ECHO LV ESV INDEX BP: 27 ML/M2
ECHO LV FRACTIONAL SHORTENING: 31 % (ref 28–44)
ECHO LV INTERNAL DIMENSION DIASTOLE INDEX: 2.25 CM/M2
ECHO LV INTERNAL DIMENSION DIASTOLIC: 4.8 CM (ref 4.2–5.9)
ECHO LV INTERNAL DIMENSION SYSTOLIC INDEX: 1.55 CM/M2
ECHO LV INTERNAL DIMENSION SYSTOLIC: 3.3 CM
ECHO LV IVSD: 1.8 CM (ref 0.6–1)
ECHO LV MASS 2D: 318.8 G (ref 88–224)
ECHO LV MASS INDEX 2D: 149.7 G/M2 (ref 49–115)
ECHO LV POSTERIOR WALL DIASTOLIC: 1.3 CM (ref 0.6–1)
ECHO LV RELATIVE WALL THICKNESS RATIO: 0.54
ECHO LVOT AREA: 3.5 CM2
ECHO LVOT AV VTI INDEX: 0.6
ECHO LVOT DIAM: 2.1 CM
ECHO LVOT MEAN GRADIENT: 2 MMHG
ECHO LVOT PEAK GRADIENT: 4 MMHG
ECHO LVOT PEAK VELOCITY: 1 M/S
ECHO LVOT STROKE VOLUME INDEX: 26.5 ML/M2
ECHO LVOT SV: 56.4 ML
ECHO LVOT VTI: 16.3 CM
ECHO MV REGURGITANT PEAK GRADIENT: 34 MMHG
ECHO MV REGURGITANT PEAK VELOCITY: 2.9 M/S
ECHO PV MAX VELOCITY: 0.7 M/S
ECHO PV PEAK GRADIENT: 2 MMHG
ECHO RIGHT VENTRICULAR SYSTOLIC PRESSURE (RVSP): 12 MMHG
ECHO RV INTERNAL DIMENSION: 2.7 CM
ECHO RV TAPSE: 1.6 CM (ref 1.7–?)
ECHO TV REGURGITANT MAX VELOCITY: 1.49 M/S
ECHO TV REGURGITANT PEAK GRADIENT: 9 MMHG
EKG ATRIAL RATE: 66 BPM
EKG DIAGNOSIS: NORMAL
EKG DIAGNOSIS: NORMAL
EKG P AXIS: 60 DEGREES
EKG P-R INTERVAL: 178 MS
EKG Q-T INTERVAL: 390 MS
EKG QRS DURATION: 120 MS
EKG QRS DURATION: 120 MS
EKG QTC CALCULATION (BAZETT): 469 MS
EKG R AXIS: -21 DEGREES
EKG R AXIS: -25 DEGREES
EKG T AXIS: 39 DEGREES
EKG T AXIS: 46 DEGREES
EKG VENTRICULAR RATE: 87 BPM
EOSINOPHIL # BLD: 0.2 K/UL (ref 0–0.4)
EOSINOPHIL NFR BLD: 3 % (ref 0–7)
ERYTHROCYTE [DISTWIDTH] IN BLOOD BY AUTOMATED COUNT: 12.8 % (ref 11.5–14.5)
GLUCOSE SERPL-MCNC: 137 MG/DL (ref 65–100)
HGB BLD-MCNC: 14.3 G/DL (ref 12.1–17)
IMM GRANULOCYTES # BLD AUTO: 0 K/UL (ref 0–0.04)
IMM GRANULOCYTES NFR BLD AUTO: 0 % (ref 0–0.5)
LYMPHOCYTES # BLD: 1.8 K/UL (ref 0.8–3.5)
LYMPHOCYTES NFR BLD: 27 % (ref 12–49)
MCH RBC QN AUTO: 32.5 PG (ref 26–34)
MCV RBC AUTO: 90.9 FL (ref 80–99)
MONOCYTES # BLD: 0.7 K/UL (ref 0–1)
MONOCYTES NFR BLD: 11 % (ref 5–13)
NEUTS SEG # BLD: 4.1 K/UL (ref 1.8–8)
NEUTS SEG NFR BLD: 58 % (ref 32–75)
NRBC # BLD: 0 K/UL (ref 0–0.01)
NRBC BLD-RTO: 0 PER 100 WBC
PMV BLD AUTO: 10.4 FL (ref 8.9–12.9)
POTASSIUM SERPL-SCNC: 4 MMOL/L (ref 3.5–5.1)
RBC # BLD AUTO: 4.4 M/UL (ref 4.1–5.7)
SODIUM SERPL-SCNC: 138 MMOL/L (ref 136–145)
SPECIMEN EXP DATE BLD: NORMAL
WBC # BLD AUTO: 6.9 K/UL (ref 4.1–11.1)

## 2024-05-14 PROCEDURE — 3700000000 HC ANESTHESIA ATTENDED CARE

## 2024-05-14 PROCEDURE — 92960 CARDIOVERSION ELECTRIC EXT: CPT

## 2024-05-14 PROCEDURE — 80048 BASIC METABOLIC PNL TOTAL CA: CPT

## 2024-05-14 PROCEDURE — 2580000003 HC RX 258: Performed by: NURSE ANESTHETIST, CERTIFIED REGISTERED

## 2024-05-14 PROCEDURE — 36415 COLL VENOUS BLD VENIPUNCTURE: CPT

## 2024-05-14 PROCEDURE — 86900 BLOOD TYPING SEROLOGIC ABO: CPT

## 2024-05-14 PROCEDURE — 5A2204Z RESTORATION OF CARDIAC RHYTHM, SINGLE: ICD-10-PCS | Performed by: INTERNAL MEDICINE

## 2024-05-14 PROCEDURE — 6370000000 HC RX 637 (ALT 250 FOR IP): Performed by: INTERNAL MEDICINE

## 2024-05-14 PROCEDURE — B24BZZ4 ULTRASONOGRAPHY OF HEART WITH AORTA, TRANSESOPHAGEAL: ICD-10-PCS | Performed by: INTERNAL MEDICINE

## 2024-05-14 PROCEDURE — 2580000003 HC RX 258: Performed by: STUDENT IN AN ORGANIZED HEALTH CARE EDUCATION/TRAINING PROGRAM

## 2024-05-14 PROCEDURE — 6360000002 HC RX W HCPCS: Performed by: NURSE PRACTITIONER

## 2024-05-14 PROCEDURE — 6360000002 HC RX W HCPCS: Performed by: NURSE ANESTHETIST, CERTIFIED REGISTERED

## 2024-05-14 PROCEDURE — 93306 TTE W/DOPPLER COMPLETE: CPT

## 2024-05-14 PROCEDURE — 85025 COMPLETE CBC W/AUTO DIFF WBC: CPT

## 2024-05-14 PROCEDURE — 86850 RBC ANTIBODY SCREEN: CPT

## 2024-05-14 PROCEDURE — 3700000001 HC ADD 15 MINUTES (ANESTHESIA)

## 2024-05-14 PROCEDURE — 86901 BLOOD TYPING SEROLOGIC RH(D): CPT

## 2024-05-14 RX ORDER — SODIUM CHLORIDE 9 MG/ML
INJECTION, SOLUTION INTRAVENOUS CONTINUOUS PRN
Status: DISCONTINUED | OUTPATIENT
Start: 2024-05-14 | End: 2024-05-14 | Stop reason: SDUPTHER

## 2024-05-14 RX ORDER — PHENYLEPHRINE HCL IN 0.9% NACL 0.4MG/10ML
SYRINGE (ML) INTRAVENOUS PRN
Status: DISCONTINUED | OUTPATIENT
Start: 2024-05-14 | End: 2024-05-14 | Stop reason: SDUPTHER

## 2024-05-14 RX ORDER — TADALAFIL 20 MG/1
20 TABLET ORAL PRN
COMMUNITY

## 2024-05-14 RX ORDER — MORPHINE SULFATE 2 MG/ML
1 INJECTION, SOLUTION INTRAMUSCULAR; INTRAVENOUS EVERY 4 HOURS PRN
Status: DISCONTINUED | OUTPATIENT
Start: 2024-05-14 | End: 2024-05-14 | Stop reason: HOSPADM

## 2024-05-14 RX ADMIN — METOPROLOL TARTRATE 25 MG: 25 TABLET, FILM COATED ORAL at 12:05

## 2024-05-14 RX ADMIN — APIXABAN 5 MG: 5 TABLET, FILM COATED ORAL at 12:05

## 2024-05-14 RX ADMIN — SODIUM CHLORIDE, PRESERVATIVE FREE 10 ML: 5 INJECTION INTRAVENOUS at 12:08

## 2024-05-14 RX ADMIN — MORPHINE SULFATE 1 MG: 2 INJECTION, SOLUTION INTRAMUSCULAR; INTRAVENOUS at 00:47

## 2024-05-14 RX ADMIN — MORPHINE SULFATE 1 MG: 2 INJECTION, SOLUTION INTRAMUSCULAR; INTRAVENOUS at 06:52

## 2024-05-14 RX ADMIN — PROPOFOL 30 MG: 10 INJECTION, EMULSION INTRAVENOUS at 10:49

## 2024-05-14 RX ADMIN — MORPHINE SULFATE 1 MG: 2 INJECTION, SOLUTION INTRAMUSCULAR; INTRAVENOUS at 12:12

## 2024-05-14 RX ADMIN — PROPOFOL 50 MG: 10 INJECTION, EMULSION INTRAVENOUS at 10:46

## 2024-05-14 RX ADMIN — SODIUM CHLORIDE: 9 INJECTION, SOLUTION INTRAVENOUS at 10:38

## 2024-05-14 RX ADMIN — Medication 80 MCG: at 10:51

## 2024-05-14 RX ADMIN — ASPIRIN 81 MG: 81 TABLET, COATED ORAL at 12:05

## 2024-05-14 RX ADMIN — PROPOFOL 50 MG: 10 INJECTION, EMULSION INTRAVENOUS at 10:47

## 2024-05-14 ASSESSMENT — PAIN DESCRIPTION - ORIENTATION
ORIENTATION: MID
ORIENTATION: MID

## 2024-05-14 ASSESSMENT — PAIN DESCRIPTION - LOCATION
LOCATION: CHEST

## 2024-05-14 ASSESSMENT — PAIN SCALES - GENERAL
PAINLEVEL_OUTOF10: 0
PAINLEVEL_OUTOF10: 7
PAINLEVEL_OUTOF10: 0

## 2024-05-14 ASSESSMENT — PAIN - FUNCTIONAL ASSESSMENT
PAIN_FUNCTIONAL_ASSESSMENT: ACTIVITIES ARE NOT PREVENTED
PAIN_FUNCTIONAL_ASSESSMENT: ACTIVITIES ARE NOT PREVENTED

## 2024-05-14 ASSESSMENT — PAIN DESCRIPTION - DESCRIPTORS
DESCRIPTORS: ACHING;THROBBING
DESCRIPTORS: ACHING

## 2024-05-14 NOTE — ANESTHESIA POSTPROCEDURE EVALUATION
Department of Anesthesiology  Postprocedure Note    Patient: Keegan Cervantes  MRN: [id number]  YOB: 1955  Date of evaluation: 5/14/2024    Procedure Summary       Date: 05/14/24 Room / Location: [facility] NON-INVASIVE CARDIOLOGY    Anesthesia Start: 1038 Anesthesia Stop: 1109    Procedure: RUPAL W/ POSSIBLE CARDIOVERSION (PRN CONTRAST/BUBBLE/3D) Diagnosis: Atrial fibrillation with RVR (HCC)    Scheduled Providers: Epi Fountain MD Responsible Provider: Kinaa Barger DO    Anesthesia Type: MAC ASA Status: 3            Anesthesia Type: MAC    Brando Phase I: Brando Score: 10    Brando Phase II:      Anesthesia Post Evaluation    Patient location during evaluation: PACU  Patient participation: complete - patient participated  Level of consciousness: awake and alert  Pain score: 0  Airway patency: patent  Nausea & Vomiting: no nausea and no vomiting  Cardiovascular status: blood pressure returned to baseline and hemodynamically stable  Respiratory status: acceptable and room air  Hydration status: euvolemic  Multimodal analgesia pain management approach  Pain management: adequate and satisfactory to patient    No notable events documented.

## 2024-05-14 NOTE — CARE COORDINATION
CM provided Eliquis medication cards to pt for 30 days free and $10 co-pay (maximum) for commercial insurance. CM explained this does not mean it will be $10, but will be discounted co-pay depending on insurance plan. CM will continue to follow.

## 2024-05-14 NOTE — PROGRESS NOTES
Physician Progress Note      PATIENT:               ANAY CHANEY  [facility] #:                  [id number]  :                       1955  ADMIT DATE:       2024 12:43 PM  DISCH DATE:        2024 4:01 PM  RESPONDING  PROVIDER #:        Chris Calvin MD          QUERY TEXT:    Patient admitted with new onset atrial fibrillation with RVR. Patient   underwent a left heart catheterization and RUPAL with cardioversion and has been   started on Eliquis. If possible, please document in progress notes and   discharge summary if you are evaluating and/or treating any of the following:    The medical record reflects the following:  Risk Factors:  -per H&P documented 24, \"69 y.o. male who presents with chest pain, afib   with RVR\"    Clinical Indicators:  -per H&P documented 24, \"69-year-old  male with past medical   history of GERD, hypertension, coronary artery disease status post stent   placement, hyperlipidemia\"    Treatment:  -labs, EKG, 2D echo, left heart catheterization, RUPAL with cardioversion,   Eliquis 5mg PO BID    Thank you,  TRACIE Mccurdy, RN, CCDS, CRCR  [email or web address] or contact via Perfect Serve  Options provided:  -- Secondary hypercoagulable state in a patient with atrial fibrillation  -- Other - I will add my own diagnosis  -- Disagree - Not applicable / Not valid  -- Disagree - Clinically unable to determine / Unknown  -- Refer to Clinical Documentation Reviewer    PROVIDER RESPONSE TEXT:    This patient has secondary hypercoagulable state in a patient with atrial   fibrillation.    Query created by: Bobbi Bartlett on 2024 12:04 PM      Electronically signed by:  Chris Calvin MD 2024 6:32 PM

## 2024-05-14 NOTE — CARDIO/PULMONARY
Cardiac Rehab: Review of chart done on Keegan Cervantes for possible STEMI. The following is consult from Dr Degroot after cardiac cath 5/13:    Concern for (+) Sgarbossa criteria with anterior WMA on stat bedside TTE  Taken to Cath Lab emergently for cardiac catheterization given possible positive Sgarboss criteria on ECG with stat bedside echo showing anterior and anterior septal wall hypokinesis  Catheterization shows no high-grade obstructive stenoses  Suspect symptoms are related to new onset atrial fibrillation with RVR  He should be admitted overnight to monitor on telemetry for treatment of the atrial fibrillation with RVR  New onset atrial fibrillation with RVR  Metoprolol tartrate 25 mg twice daily  Telemetry  Obtain echocardiogram  Start Eliquis 5 mg every 12 hours  Will schedule for RUPAL/DCCV tomorrow  Please keep NPO after MN  HTN  Hold losartan 50 mg once daily to allow for BP room for metoprolol tartrate for rate control  CAD s/p prior PCI  Okay to continue low-dose aspirin for now  Continue rosuvastatin  Therefore, will not refer to OP program- not a STEMI. Will follow for echo results and enroll in program if his EF meets criteria for HF.Tamela Beck RN

## 2024-05-14 NOTE — DISCHARGE SUMMARY
THE FOLLOWING:   Fever over 101 degrees for 24 hours.   Chest pain, shortness of breath, fever, chills, nausea, vomiting, diarrhea, change in mentation, falling, weakness, bleeding. Severe pain or pain not relieved by medications.  Or, any other signs or symptoms that you may have questions about.    DISPOSITION:   X Home With:   OT  PT  HH  RN       Long term SNF/Inpatient Rehab    Independent/assisted living    Hospice    Other:       PATIENT CONDITION AT DISCHARGE:     Functional status    Poor     Deconditioned    X Independent      Cognition    X Lucid     Forgetful     Dementia      Catheters/lines (plus indication)    [place]     PICC     PEG    X None      Code status    X Full code     DNR      PHYSICAL EXAMINATION AT DISCHARGE:    General : alert x 3, awake, no acute distress,   HEENT: PEERL, EOMI, moist mucus membrane  Neck: supple, no JVD, no meningeal signs  Chest: Clear to auscultation bilaterally   CVS: S1 S2 heard, Capillary refill less than 2 seconds  Abd: soft/ Non tender, non distended, BS physiological,   Ext: no clubbing, no cyanosis, no edema, brisk 2+ DP pulses  Neuro/Psych: pleasant mood and affect, CN 2-12 grossly intact, sensory grossly within normal limit, Strength 5/5 in all extremities  Skin: warm     CHRONIC MEDICAL DIAGNOSES:      Greater than 31 minutes were spent with the patient on counseling and coordination of care    Signed:   Chris Calvin MD  5/14/2024  1:27 PM

## 2024-05-14 NOTE — PROGRESS NOTES
[facility] Cardiology Progress Note    Usual Cardiologist: Dr. Emanuel Maldonado  Date of Service: 2024    Assessment/Recommendations:    Concern for (+) Sgarbossa criteria with anterior WMA on stat bedside TTE  Taken to Cath Lab emergently for cardiac catheterization given possible positive Sgarboss criteria on ECG with stat bedside echo showing anterior and anterior septal wall hypokinesis  Catheterization shows no high-grade obstructive stenoses  Suspect symptoms are related to new onset atrial fibrillation with RVR  He should be admitted overnight to monitor on telemetry for treatment of the atrial fibrillation with RVR  New onset atrial fibrillation with RVR  Metoprolol tartrate 25 mg twice daily  Telemetry  TTE shows EF 50-55%  Started Eliquis 5 mg every 12 hours  S/p RUPAL/DCCV 24 with restoration of SR  HTN  Hold losartan 50 mg once daily to allow for BP room for metoprolol tartrate for rate control  CAD s/p prior PCI  Okay to continue low-dose aspirin for now  Continue rosuvastatin    OK to discharge after RUPAL/DCCV if feeling well.  Follow-up with Dr. Maldonado.    Thank you for the opportunity to participate in the care of Keegan Cervantes and please do not hesitate to contact us should you have any questions.    Subjective:  No acute events overnight. Has some chest discomfort that resolved overnight.  Denies dyspnea.  Remains in atrial fibrillation.      ADDENDUM:  Underwent RUPAL/cardioversion this morning with restoration of sinus rhythm.    Objective:    Temp (24hrs), Av.5 °F (36.4 °C), Min:97.3 °F (36.3 °C), Max:98 °F (36.7 °C)    Patient Vitals for the past 8 hrs:   Pulse   24 1400 70   24 1205 60   24 1200 56    Patient Vitals for the past 8 hrs:   Resp   24 1205 18    Patient Vitals for the past 8 hrs:   BP   24 1205 132/61          Intake/Output Summary (Last 24 hours) at 2024 1722  Last data filed at 2024 1103  Gross per 24 hour   Intake 300 ml   Output --   Net

## 2024-05-14 NOTE — PROGRESS NOTES
Admission Medication Reconciliation:    Information obtained from:  Patient and wife at the bedside  RxQuery data available¹:  Yes    Comments/Recommendations: Updated PTA meds/reviewed patient's allergies.    1)  Information obtained from patient and wife at the bedside; reliable historians; rx query available to confirm information    2)  Medication changes (since last review):  Added  - cialis PRN ED       ¹RxQuery pharmacy benefit data reflects medications filled and processed through the patient's insurance, however   this data does NOT capture whether the medication was picked up or is currently being taken by the patient.    Allergies:  Bee venom, Cat hair extract, and Pseudoephedrine hcl    Significant PMH/Disease States:   Past Medical History:   Diagnosis Date    Arthritis     GERD (gastroesophageal reflux disease)     HTN (hypertension) 12/23/2010    no longer treated    Inguinal hernia 12/23/2010     Chief Complaint for this Admission:    Chief Complaint   Patient presents with    Chest Pain     Prior to Admission Medications:   Prior to Admission Medications   Prescriptions Last Dose Informant   amLODIPine (NORVASC) 5 MG tablet Past Week at 2000    Sig: Take 1 tablet by mouth daily   aspirin 81 MG EC tablet Past Week at 2000    Sig: Take 1 tablet by mouth daily              escitalopram (LEXAPRO) 10 MG tablet Past Week at 2000    Sig: Take 1 tablet by mouth daily   losartan (COZAAR) 50 MG tablet Past Week at 2000    Sig: Take 1 tablet by mouth daily                                               pantoprazole (PROTONIX) 40 MG tablet Past Week at 0800    Sig: Take 1 tablet by mouth daily   rosuvastatin (CRESTOR) 40 MG tablet Past Week at 2000    Sig: Take 1 tablet by mouth every evening   tadalafil (CIALIS) 20 MG tablet Past Month    Sig: Take 1 tablet by mouth as needed for Erectile Dysfunction      Facility-Administered Medications: None     Please contact the main inpatient pharmacy with any questions

## 2024-05-14 NOTE — PROCEDURES
Procedure:  TRANSESOPHAGEAL ECHO    Indication: AF    Description:  The patient  was brought to the holding area in a fasting state. Both benefits and risks of the procedure were explained in detail to the patient. Patient understands the risks of major complications -- Serious complications including death, sustained ventricular tachycardia, and severe angina have been estimated at less than 1 in 5000. There is 1 in 10,000 risk of perforationMethemoglobinemia is very rare.  Two in 3000 cased serious complications related to patient sedation  Minor complications -- Minor complications are seen in fewer than 1 in 500 examinations. These include transient bronchospasm, transient hypoxia, nonsustained ventricular tachycardia, transient atrial fibrillation, minimal hemoptysis, vomiting, or pharyngeal hematoma.    The oropharynx was locally anesthesized using benzocaine spray. Patient received anesthesia via anesthesia team. Insertion of the probe was uneventful.  Patient tolerated the procedure well, there were no complications.    FINDINGS  Normal LVEF  No JENNIFER thrombus  No evidence of interatrial shunting by saline contrast or Doppler    Indication: Atrial Fibrillation  Informed consent was obtained after full discussion of all risks/benefits/complications and alternatives.  Please see full chart for further details.    Patient received anesthesia as per anesthesia records.  Patient received x1 synchronized biphasic shock(s) at 200 J.  Final Rhythm was NSR  Complications:  none

## 2024-05-14 NOTE — PROGRESS NOTES
TRANSFER - IN REPORT:    Verbal report received from Monica RN(name) on Keegan Cervantes  being received from IM(unit) for ordered procedure      Report consisted of patient’s Situation, Background, Assessment and   Recommendations(SBAR).     Information from the following report(s) Adult Overview, Recent Results, and Cardiac Rhythm A. Fib  was reviewed with the receiving nurse.    Opportunity for questions and clarification was provided.      Assessment completed upon patient’s arrival to unit and care assume

## 2024-05-14 NOTE — ANESTHESIA PRE PROCEDURE
Department of Anesthesiology  Preprocedure Note       Name:  Keegan Cervantes   Age:  69 y.o.  :  1955                                          MRN:  [id number]         Date:  2024      Surgeon: * No surgeons listed *    Procedure: * No procedures listed *    Medications prior to admission:   Prior to Admission medications    Medication Sig Start Date End Date Taking? Authorizing Provider   pantoprazole (PROTONIX) 40 MG tablet Take 1 tablet by mouth daily    Aurelio Morales MD   losartan (COZAAR) 50 MG tablet Take 1 tablet by mouth daily    Aurelio Morales MD   amLODIPine (NORVASC) 5 MG tablet Take 1 tablet by mouth daily    Aurelio Morales MD   rosuvastatin (CRESTOR) 40 MG tablet Take 1 tablet by mouth every evening    Aurelio Morales MD   Escitalopram Oxalate (LEXAPRO PO) Take by mouth daily    Aurelio Morales MD   aspirin 81 MG EC tablet Take 1 tablet by mouth daily    Aurelio Morales MD   diazePAM (VALIUM) 10 MG tablet Take 10 mg by mouth every 6 hours as needed.  Patient not taking: Reported on 2024 10/19/12   Automatic Reconciliation, Ar   ibuprofen (ADVIL;MOTRIN) 200 MG CAPS capsule Take 4 capsules by mouth daily    Automatic Reconciliation, Ar       Current medications:    No current facility-administered medications for this encounter.     No current outpatient medications on file.     Facility-Administered Medications Ordered in Other Encounters   Medication Dose Route Frequency Provider Last Rate Last Admin   • morphine (PF) injection 1 mg  1 mg IntraVENous Q4H PRN Leatha Sepulveda APRN - NP   1 mg at 24 0652   • metoprolol tartrate (LOPRESSOR) tablet 25 mg  25 mg Oral BID Edy Degroot MD   25 mg at 24   • rosuvastatin (CRESTOR) tablet 20 mg  20 mg Oral Nightly Edy Degroot MD   20 mg at 24   • aspirin EC tablet 81 mg  81 mg Oral Daily Edy Degroot MD       • apixaban (ELIQUIS) tablet 5 mg  5 mg Oral BID Zane

## 2024-05-15 LAB — ECHO BSA: 2.15 M2

## 2024-10-17 ENCOUNTER — APPOINTMENT (OUTPATIENT)
Facility: HOSPITAL | Age: 69
End: 2024-10-17
Payer: COMMERCIAL

## 2024-10-17 ENCOUNTER — HOSPITAL ENCOUNTER (EMERGENCY)
Facility: HOSPITAL | Age: 69
Discharge: HOME | End: 2024-10-17
Attending: EMERGENCY MEDICINE
Payer: COMMERCIAL

## 2024-10-17 VITALS
TEMPERATURE: 98.1 F | SYSTOLIC BLOOD PRESSURE: 134 MMHG | OXYGEN SATURATION: 96 % | DIASTOLIC BLOOD PRESSURE: 76 MMHG | HEART RATE: 68 BPM | RESPIRATION RATE: 16 BRPM

## 2024-10-17 DIAGNOSIS — R06.89 DYSPNEA AND RESPIRATORY ABNORMALITIES: ICD-10-CM

## 2024-10-17 DIAGNOSIS — R06.00 DYSPNEA AND RESPIRATORY ABNORMALITIES: ICD-10-CM

## 2024-10-17 DIAGNOSIS — R07.9 CHEST PAIN, UNSPECIFIED TYPE: Primary | ICD-10-CM

## 2024-10-17 LAB
ALBUMIN SERPL-MCNC: 4 G/DL (ref 3.5–5)
ALBUMIN/GLOB SERPL: 1.4 (ref 1.1–2.2)
ALP SERPL-CCNC: 66 U/L (ref 45–117)
ALT SERPL-CCNC: 31 U/L (ref 12–78)
ANION GAP SERPL CALC-SCNC: 4 MMOL/L (ref 2–12)
AST SERPL-CCNC: 22 U/L (ref 15–37)
BASOPHILS # BLD: 0.1 K/UL (ref 0–0.1)
BASOPHILS NFR BLD: 1 % (ref 0–1)
BILIRUB SERPL-MCNC: 0.8 MG/DL (ref 0.2–1)
BUN SERPL-MCNC: 14 MG/DL (ref 6–20)
BUN/CREAT SERPL: 15 (ref 12–20)
CALCIUM SERPL-MCNC: 9.6 MG/DL (ref 8.5–10.1)
CHLORIDE SERPL-SCNC: 110 MMOL/L (ref 97–108)
CO2 SERPL-SCNC: 25 MMOL/L (ref 21–32)
COMMENT:: NORMAL
CREAT SERPL-MCNC: 0.92 MG/DL (ref 0.7–1.3)
DIFFERENTIAL METHOD BLD: NORMAL
EKG ATRIAL RATE: 57 BPM
EKG DIAGNOSIS: NORMAL
EKG P AXIS: 65 DEGREES
EKG P-R INTERVAL: 190 MS
EKG Q-T INTERVAL: 438 MS
EKG QRS DURATION: 122 MS
EKG QTC CALCULATION (BAZETT): 426 MS
EKG R AXIS: -22 DEGREES
EKG T AXIS: 78 DEGREES
EKG VENTRICULAR RATE: 57 BPM
EOSINOPHIL # BLD: 0.1 K/UL (ref 0–0.4)
EOSINOPHIL NFR BLD: 1 % (ref 0–7)
ERYTHROCYTE [DISTWIDTH] IN BLOOD BY AUTOMATED COUNT: 12.4 % (ref 11.5–14.5)
GLOBULIN SER CALC-MCNC: 2.9 G/DL (ref 2–4)
GLUCOSE SERPL-MCNC: 99 MG/DL (ref 65–100)
HCT VFR BLD AUTO: 41.4 % (ref 36.6–50.3)
HGB BLD-MCNC: 14.6 G/DL (ref 12.1–17)
IMM GRANULOCYTES # BLD AUTO: 0 K/UL (ref 0–0.04)
IMM GRANULOCYTES NFR BLD AUTO: 0 % (ref 0–0.5)
LYMPHOCYTES # BLD: 1.5 K/UL (ref 0.8–3.5)
LYMPHOCYTES NFR BLD: 20 % (ref 12–49)
MCH RBC QN AUTO: 31.9 PG (ref 26–34)
MCHC RBC AUTO-ENTMCNC: 35.3 G/DL (ref 30–36.5)
MCV RBC AUTO: 90.4 FL (ref 80–99)
MONOCYTES # BLD: 0.6 K/UL (ref 0–1)
MONOCYTES NFR BLD: 8 % (ref 5–13)
NEUTS SEG # BLD: 5.4 K/UL (ref 1.8–8)
NEUTS SEG NFR BLD: 70 % (ref 32–75)
NRBC # BLD: 0 K/UL (ref 0–0.01)
NRBC BLD-RTO: 0 PER 100 WBC
PLATELET # BLD AUTO: 175 K/UL (ref 150–400)
PMV BLD AUTO: 10.7 FL (ref 8.9–12.9)
POTASSIUM SERPL-SCNC: 3.7 MMOL/L (ref 3.5–5.1)
PROT SERPL-MCNC: 6.9 G/DL (ref 6.4–8.2)
RBC # BLD AUTO: 4.58 M/UL (ref 4.1–5.7)
SODIUM SERPL-SCNC: 139 MMOL/L (ref 136–145)
SPECIMEN HOLD: NORMAL
TROPONIN I SERPL HS-MCNC: 12 NG/L (ref 0–76)
TROPONIN I SERPL HS-MCNC: 14 NG/L (ref 0–76)
WBC # BLD AUTO: 7.7 K/UL (ref 4.1–11.1)

## 2024-10-17 PROCEDURE — 93005 ELECTROCARDIOGRAM TRACING: CPT | Performed by: EMERGENCY MEDICINE

## 2024-10-17 PROCEDURE — 84484 ASSAY OF TROPONIN QUANT: CPT

## 2024-10-17 PROCEDURE — 80053 COMPREHEN METABOLIC PANEL: CPT

## 2024-10-17 PROCEDURE — 6360000002 HC RX W HCPCS: Performed by: EMERGENCY MEDICINE

## 2024-10-17 PROCEDURE — 96374 THER/PROPH/DIAG INJ IV PUSH: CPT

## 2024-10-17 PROCEDURE — 36415 COLL VENOUS BLD VENIPUNCTURE: CPT

## 2024-10-17 PROCEDURE — 6370000000 HC RX 637 (ALT 250 FOR IP): Performed by: EMERGENCY MEDICINE

## 2024-10-17 PROCEDURE — 99285 EMERGENCY DEPT VISIT HI MDM: CPT

## 2024-10-17 PROCEDURE — 71045 X-RAY EXAM CHEST 1 VIEW: CPT

## 2024-10-17 PROCEDURE — 85025 COMPLETE CBC W/AUTO DIFF WBC: CPT

## 2024-10-17 RX ORDER — LORAZEPAM 2 MG/ML
1 INJECTION INTRAMUSCULAR ONCE
Status: COMPLETED | OUTPATIENT
Start: 2024-10-17 | End: 2024-10-17

## 2024-10-17 RX ADMIN — LORAZEPAM 1 MG: 2 INJECTION INTRAMUSCULAR; INTRAVENOUS at 14:42

## 2024-10-17 RX ADMIN — ALUMINUM HYDROXIDE, MAGNESIUM HYDROXIDE, AND SIMETHICONE 40 ML: 1200; 120; 1200 SUSPENSION ORAL at 15:27

## 2024-10-17 ASSESSMENT — ENCOUNTER SYMPTOMS
SINUS PAIN: 0
COUGH: 0
BACK PAIN: 0
COLOR CHANGE: 0
SINUS PRESSURE: 0
WHEEZING: 0
DIARRHEA: 0
RHINORRHEA: 0
SORE THROAT: 0
SHORTNESS OF BREATH: 1
BLOOD IN STOOL: 0
BACK PAIN: 1
SHORTNESS OF BREATH: 0
NAUSEA: 1
TROUBLE SWALLOWING: 0
STRIDOR: 0
EYES NEGATIVE: 1
VOMITING: 0
ABDOMINAL PAIN: 0
NAUSEA: 0
CHEST TIGHTNESS: 0

## 2024-10-17 ASSESSMENT — PAIN DESCRIPTION - ORIENTATION: ORIENTATION: MID

## 2024-10-17 ASSESSMENT — PAIN DESCRIPTION - LOCATION: LOCATION: CHEST

## 2024-10-17 ASSESSMENT — PAIN - FUNCTIONAL ASSESSMENT
PAIN_FUNCTIONAL_ASSESSMENT: PREVENTS OR INTERFERES SOME ACTIVE ACTIVITIES AND ADLS
PAIN_FUNCTIONAL_ASSESSMENT: 0-10

## 2024-10-17 ASSESSMENT — PAIN DESCRIPTION - DESCRIPTORS: DESCRIPTORS: OTHER (COMMENT)

## 2024-10-17 ASSESSMENT — PAIN SCALES - GENERAL: PAINLEVEL_OUTOF10: 6

## 2024-10-17 NOTE — ED PROVIDER NOTES
change, pallor and rash.   Neurological:  Negative for dizziness, syncope, speech difficulty, weakness, numbness and headaches.   Psychiatric/Behavioral:  Negative for agitation and behavioral problems.              PAST MEDICAL HISTORY     Past Medical History:   Diagnosis Date    Arthritis     GERD (gastroesophageal reflux disease)     HTN (hypertension) 12/23/2010    no longer treated    Inguinal hernia 12/23/2010         SURGICAL HISTORY       Past Surgical History:   Procedure Laterality Date    CARDIAC PROCEDURE N/A 5/13/2024    Left heart cath / coronary angiography performed by Edy Degroot MD at [facility] CARDIAC CATH LAB    HERNIA REPAIR  2-3-2011    [facility] REPAIR    NM APPENDECTOMY  1964    NM UNLISTED PROCEDURE ABDOMEN PERITONEUM & OMENTUM  12/2008    RT INGUNAL HERNIA    TONSILLECTOMY  1961         CURRENT MEDICATIONS       Previous Medications    APIXABAN (ELIQUIS) 5 MG TABS TABLET    Take 1 tablet by mouth 2 times daily    ASPIRIN 81 MG EC TABLET    Take 1 tablet by mouth daily    ESCITALOPRAM (LEXAPRO) 10 MG TABLET    Take 1 tablet by mouth daily    METOPROLOL TARTRATE (LOPRESSOR) 25 MG TABLET    Take 1 tablet by mouth 2 times daily    PANTOPRAZOLE (PROTONIX) 40 MG TABLET    Take 1 tablet by mouth daily    ROSUVASTATIN (CRESTOR) 40 MG TABLET    Take 1 tablet by mouth every evening    TADALAFIL (CIALIS) 20 MG TABLET    Take 1 tablet by mouth as needed for Erectile Dysfunction       ALLERGIES     Bee venom, Cat hair extract, and Pseudoephedrine hcl    FAMILY HISTORY       Family History   Problem Relation Age of Onset    Cancer Father         THROAT/STOMACH    Other Mother         neuropathy/ LOW BLOOD PRESSURE    Heart Disease Mother         CHF    Cancer Maternal Aunt         BONE    Diabetes Maternal Aunt     Cancer Paternal Grandmother         BREAST          SOCIAL HISTORY       Social History     Socioeconomic History    Marital status:    Tobacco Use    Smoking status: Former     Current

## 2024-10-17 NOTE — CONSULTS
HCT 41.4 10/17/2024 12:42 PM     10/17/2024 12:42 PM    MCV 90.4 10/17/2024 12:42 PM     Troponin 12    Data review:  EKG tracing personally reviewed:   Sinus bradycardia, LBBB (chronic, unchanged).    Echocardiogram:  05/13/24    RUPAL W/ POSSIBLE CARDIOVERSION (PRN CONTRAST/BUBBLE/3D) 05/15/2024 11:32 AM (Final)    Interpretation Summary    Left Ventricle: Normal left ventricular systolic function with a visually estimated EF of 55 - 60%. Left ventricle size is normal. Normal wall thickness. Normal wall motion.    Aortic Valve: Trileaflet valve.    Mitral Valve: Mild regurgitation.    Left Atrium: Left atrium is dilated. No left atrial appendage thrombus noted.    Image quality is good.    Successful DCCV of AF -> NSR with x1 200 J synchronized shock.    Signed by: Epi Fountain MD on 5/15/2024 11:32 AM      MRI Cervical Spine   6/8/22  FINDINGS:   Alignment: No subluxation.     Intervertebral discs: Multilevel degenerative disc disease most prominent at C5-6 with mild to moderate height loss and osteophytosis.     Vertebral bodies / marrow: No fracture. No suspicious osseous lesion.     Cervical cord and posterior fossa: No abnormal cord signal.     Soft tissues: No prevertebral soft tissue swelling. Unremarkable paraspinal soft tissues.     By level,   C1/2: No significant abnormalities.      C2/3: No disc herniation, spinal canal or neural foraminal stenosis.     C3/4: Mild bilateral facet arthropathy. Minimal disc bulge. No significant spinal canal or neural foraminal stenosis.     *  C4/5: Mild bilateral facet arthropathy. Dorsal ligament buckling. Small central disc extrusion with superior migration superimposed on disc bulge and posterior annular fissure. Mild spinal canal stenosis. Mild-to-moderate right, mild left neural foraminal stenosis from uncovertebral hypertrophy.     C5/6: Small central disc extrusion with superior migration superimposed on disc bulge. Mild effacement of the ventral

## 2024-10-17 NOTE — ED TRIAGE NOTES
Pt arrives [facility] with c/o chest pain that started yesterday , LBBB that is not new, ASA and NTG given en route, radiates to neck, +sob, denies cough or fever, denies leg swelling, hx of cardiac stents

## (undated) DEVICE — KIT HND CTRL 3 W STPCOCK ROT END 54IN PREM HI PRSS TBNG AT

## (undated) DEVICE — WASTE KIT - ST MARY: Brand: MEDLINE INDUSTRIES, INC.

## (undated) DEVICE — TR BAND RADIAL ARTERY COMPRESSION DEVICE: Brand: TR BAND

## (undated) DEVICE — SPLINT WR VELC FOAM NEUT POS DISP FOR RAD ART ACC SFT STRP

## (undated) DEVICE — GLIDESHEATH SLENDER STAINLESS STEEL KIT: Brand: GLIDESHEATH SLENDER

## (undated) DEVICE — HEARTRAIL III GUIDING CATHETER: Brand: HEARTRAIL

## (undated) DEVICE — KIT AT-X65 ANGIOTOUCH HAND CONTROLLER

## (undated) DEVICE — CUSTOM KT PTCA INFL DEV K05 00052M

## (undated) DEVICE — KIT MFLD ISOLATN NACL CNTRST PRT TBNG SPIK W/ PRSS TRNSDUC

## (undated) DEVICE — ANGIOGRAPHIC CATHETER: Brand: IMPULSE™

## (undated) DEVICE — KIT MED IMAG CNTRST AGNT W/ IOPAMIDOL REUSE

## (undated) DEVICE — PROVE COVER: Brand: UNBRANDED

## (undated) DEVICE — ANGIOGRAPHY KIT

## (undated) DEVICE — SPECIAL PROCEDURE DRAPE 32" X 34": Brand: SPECIAL PROCEDURE DRAPE

## (undated) DEVICE — PACK PROCEDURE SURG HRT CATH

## (undated) DEVICE — VALVE HEMSTAS W/ GWIRE INSRTN TOOL FOR 8FR DEV GRDIAN II